# Patient Record
Sex: FEMALE | Race: WHITE | Employment: OTHER | URBAN - METROPOLITAN AREA
[De-identification: names, ages, dates, MRNs, and addresses within clinical notes are randomized per-mention and may not be internally consistent; named-entity substitution may affect disease eponyms.]

---

## 2018-12-12 PROBLEM — I48.20 CHRONIC ATRIAL FIBRILLATION (HCC): Status: ACTIVE | Noted: 2018-12-12

## 2020-09-25 PROBLEM — G47.00 INSOMNIA: Status: ACTIVE | Noted: 2020-09-25

## 2020-09-25 PROBLEM — M17.0 PRIMARY OSTEOARTHRITIS OF BOTH KNEES: Status: ACTIVE | Noted: 2020-09-25

## 2020-09-26 ENCOUNTER — HOSPITAL ENCOUNTER (OUTPATIENT)
Dept: MAMMOGRAPHY | Age: 75
Discharge: HOME | End: 2020-09-26
Attending: FAMILY MEDICINE
Payer: MEDICARE

## 2020-09-26 DIAGNOSIS — Z12.31 VISIT FOR SCREENING MAMMOGRAM: ICD-10-CM

## 2020-09-26 PROCEDURE — 77067 SCR MAMMO BI INCL CAD: CPT

## 2020-10-29 NOTE — H&P
Pre Operative History and Physical Exam    Patient ID:  Viola Guan  [id number]  69 y.o.  1945    Today: October 29, 2020       Assessment:   1. Arthritis of the right knee        Plan:    1. Proceed with scheduled Procedure(s) (LRB):  RIGHT KNEE ARTHROPLASTY TOTAL CHOICE ANES/ SPINAL (Right)            CC:  Right knee pain    HPI:   The patient has  Right knee arthritis. The patient was evaluated and examined during a consultation prior to this office visit. There have been no changes to the patient's orthopedic condition since the initial consultation. The patient has failed previous conservative treatment for this condition. The patient will present the day of surgery for Procedure(s) (LRB):  RIGHT KNEE ARTHROPLASTY TOTAL CHOICE ANES/ SPINAL (Right)      Past Medical/Surgical History:  Past Medical History:   Diagnosis Date    Asthma 11/7/2012    Atrial fibrillation (HCC)     CAD (coronary artery disease)     HLD (hyperlipidemia) 11/7/2012    HTN (hypertension) 11/7/2012    Hypercholesterolemia     Menopause 11/7/2012    Thyroid disease      Past Surgical History:   Procedure Laterality Date    HX AFIB ABLATION  06/29/2020    Dr. Jesus Manuel Kimble HX GI      rectocele    HX HYSTERECTOMY      has ovaries    HX ANY AND BSO          Allergies: Allergies   Allergen Reactions    Other Medication Other (comments)     Ants    Perfume Other (comments)     Scents make feel bad        Physical Exam:   General: NAD, Alert, Oriented, Appears their stated age     [de-identified]: NC/AT, PERRL    Skin: No rashes, lesions or wounds seen      Psych: normal affect      Heart: Regular Rate, Rhythm     Lungs: unlabored respirations, normal breath sounds     Abdomen: Soft and non-distended     Ortho:  Right knee joint line tenderness    Neuro: no focal defects, sensation is equal bilaterally     Lymph: no lymphadenopathy     Meds:   No current facility-administered medications for this encounter.       Current Outpatient Medications   Medication Sig    apixaban (ELIQUIS) 5 mg tablet Take 5 mg by mouth two (2) times a day.  levothyroxine (synthroid) 50 mcg tablet Take 1 Tab by mouth Daily (before breakfast).  zolpidem (AMBIEN) 5 mg tablet Take 1 Tab by mouth nightly as needed for Sleep. Max Daily Amount: 5 mg.  sotalol (BETAPACE) 80 mg tablet Take 160 mg by mouth two (2) times a day.  furosemide (LASIX) 40 mg tablet Take 40 mg by mouth.  potassium chloride SR (KLOR-CON 10) 10 mEq tablet Take 20 mEq by mouth daily.  amLODIPine (NORVASC) 10 mg tablet Take  by mouth daily.  atorvastatin (LIPITOR) 40 mg tablet Take  by mouth daily. Takes 20- 40mg daily    benazepril (LOTENSIN) 40 mg tablet Take 1 tablet by mouth daily. Labs:  Lab Only on 09/21/2020   Component Date Value Ref Range Status    Hemoglobin A1c 09/21/2020 5.5  4.8 - 5.6 % Final    Comment:          Prediabetes: 5.7 - 6.4           Diabetes: >6.4           Glycemic control for adults with diabetes: <7.0      Estimated average glucose 09/21/2020 111  mg/dL Final    TSH 09/21/2020 3.680  0.450 - 4.500 uIU/mL Final    Glucose 09/21/2020 104* 65 - 99 mg/dL Final    BUN 09/21/2020 8  8 - 27 mg/dL Final    Creatinine 09/21/2020 0.90  0.57 - 1.00 mg/dL Final    GFR est non-AA 09/21/2020 63  >59 mL/min/1.73 Final    GFR est AA 09/21/2020 73  >59 mL/min/1.73 Final    BUN/Creatinine ratio 09/21/2020 9* 12 - 28 Final    Sodium 09/21/2020 140  134 - 144 mmol/L Final    Potassium 09/21/2020 4.0  3.5 - 5.2 mmol/L Final    Chloride 09/21/2020 99  96 - 106 mmol/L Final    CO2 09/21/2020 25  20 - 29 mmol/L Final    Calcium 09/21/2020 9.3  8.7 - 10.3 mg/dL Final    Protein, total 09/21/2020 7.1  6.0 - 8.5 g/dL Final    Albumin 09/21/2020 4.4  3.7 - 4.7 g/dL Final    GLOBULIN, TOTAL 09/21/2020 2.7  1.5 - 4.5 g/dL Final    A-G Ratio 09/21/2020 1.6  1.2 - 2.2 Final    Bilirubin, total 09/21/2020 1.4* 0.0 - 1.2 mg/dL Final    Alk.  phosphatase 09/21/2020 79  39 - 117 IU/L Final    AST (SGOT) 09/21/2020 20  0 - 40 IU/L Final    ALT (SGPT) 09/21/2020 11  0 - 32 IU/L Final    WBC 09/21/2020 5.2  3.4 - 10.8 x10E3/uL Final    RBC 09/21/2020 4.76  3.77 - 5.28 x10E6/uL Final    HGB 09/21/2020 14.8  11.1 - 15.9 g/dL Final    HCT 09/21/2020 42.8  34.0 - 46.6 % Final    MCV 09/21/2020 90  79 - 97 fL Final    MCH 09/21/2020 31.1  26.6 - 33.0 pg Final    MCHC 09/21/2020 34.6  31.5 - 35.7 g/dL Final    RDW 09/21/2020 12.7  11.7 - 15.4 % Final    PLATELET 51/73/2178 376  150 - 450 x10E3/uL Final    NEUTROPHILS 09/21/2020 51  Not Estab. % Final    Lymphocytes 09/21/2020 33  Not Estab. % Final    MONOCYTES 09/21/2020 10  Not Estab. % Final    EOSINOPHILS 09/21/2020 4  Not Estab. % Final    BASOPHILS 09/21/2020 2  Not Estab. % Final    ABS. NEUTROPHILS 09/21/2020 2.7  1.4 - 7.0 x10E3/uL Final    Abs Lymphocytes 09/21/2020 1.7  0.7 - 3.1 x10E3/uL Final    ABS. MONOCYTES 09/21/2020 0.5  0.1 - 0.9 x10E3/uL Final    ABS. EOSINOPHILS 09/21/2020 0.2  0.0 - 0.4 x10E3/uL Final    ABS. BASOPHILS 09/21/2020 0.1  0.0 - 0.2 x10E3/uL Final    IMMATURE GRANULOCYTES 09/21/2020 0  Not Estab. % Final    ABS. IMM. GRANS. 09/21/2020 0.0  0.0 - 0.1 x10E3/uL Final    Cholesterol, total 09/21/2020 169  100 - 199 mg/dL Final    Triglyceride 09/21/2020 144  0 - 149 mg/dL Final    HDL Cholesterol 09/21/2020 44  >39 mg/dL Final    VLDL Cholesterol Herrera 09/21/2020 25  5 - 40 mg/dL Final    LDL Chol Calc (NIH) 09/21/2020 100* 0 - 99 mg/dL Final    INTERPRETATION 09/21/2020 Note   Final    Supplemental report is available.                  Patient Active Problem List   Diagnosis Code    HTN (hypertension) I10    Menopause Z78.0    HLD (hyperlipidemia) E78.5    Asthma J45.909    Acquired hypothyroidism E03.9    Chronic atrial fibrillation ([facility] [address] I48.20    Insomnia G47.00    Primary osteoarthritis of both knees M17.0         Signed By: Israel Sanders MD  October 29, 2020

## 2020-11-02 ENCOUNTER — HOSPITAL ENCOUNTER (OUTPATIENT)
Dept: SURGERY | Age: 75
Discharge: HOME | End: 2020-11-02
Payer: MEDICARE

## 2020-11-02 VITALS
HEIGHT: 62 IN | RESPIRATION RATE: 16 BRPM | HEART RATE: 69 BPM | SYSTOLIC BLOOD PRESSURE: 140 MMHG | OXYGEN SATURATION: 94 % | TEMPERATURE: 98.9 F | WEIGHT: 173 LBS | DIASTOLIC BLOOD PRESSURE: 64 MMHG | BODY MASS INDEX: 31.83 KG/M2

## 2020-11-02 LAB
ANION GAP SERPL CALC-SCNC: 6 MMOL/L (ref 7–16)
APPEARANCE UR: ABNORMAL
APTT PPP: 32.9 SEC (ref 24.1–35.1)
BACTERIA SPEC CULT: NORMAL
BACTERIA URNS QL MICRO: ABNORMAL /HPF
BASOPHILS # BLD: 0.1 K/UL (ref 0–0.2)
BASOPHILS NFR BLD: 1 % (ref 0–2)
BILIRUB UR QL: ABNORMAL
BUN SERPL-MCNC: 11 MG/DL (ref 8–23)
CALCIUM SERPL-MCNC: 8.7 MG/DL (ref 8.3–10.4)
CHLORIDE SERPL-SCNC: 105 MMOL/L (ref 98–107)
CO2 SERPL-SCNC: 30 MMOL/L (ref 21–32)
COLOR UR: ABNORMAL
CREAT SERPL-MCNC: 0.78 MG/DL (ref 0.6–1)
DIFFERENTIAL METHOD BLD: NORMAL
EOSINOPHIL # BLD: 0.2 K/UL (ref 0–0.8)
EOSINOPHIL NFR BLD: 3 % (ref 0.5–7.8)
EPI CELLS #/AREA URNS HPF: ABNORMAL /HPF
ERYTHROCYTE [DISTWIDTH] IN BLOOD BY AUTOMATED COUNT: 13.1 % (ref 11.9–14.6)
EST. AVERAGE GLUCOSE BLD GHB EST-MCNC: 114 MG/DL
GLUCOSE SERPL-MCNC: 127 MG/DL (ref 65–100)
GLUCOSE UR STRIP.AUTO-MCNC: NEGATIVE MG/DL
HBA1C MFR BLD: 5.6 % (ref 4.8–6)
HCT VFR BLD AUTO: 41.7 % (ref 35.8–46.3)
HGB BLD-MCNC: 14.3 G/DL (ref 11.7–15.4)
HGB UR QL STRIP: NEGATIVE
IMM GRANULOCYTES # BLD AUTO: 0 K/UL (ref 0–0.5)
IMM GRANULOCYTES NFR BLD AUTO: 0 % (ref 0–5)
INR PPP: 1.4
KETONES UR QL STRIP.AUTO: NEGATIVE MG/DL
LEUKOCYTE ESTERASE UR QL STRIP.AUTO: ABNORMAL
LYMPHOCYTES # BLD: 2 K/UL (ref 0.5–4.6)
LYMPHOCYTES NFR BLD: 32 % (ref 13–44)
MCH RBC QN AUTO: 31.8 PG (ref 26.1–32.9)
MCHC RBC AUTO-ENTMCNC: 34.3 G/DL (ref 31.4–35)
MCV RBC AUTO: 92.9 FL (ref 79.6–97.8)
MONOCYTES # BLD: 0.5 K/UL (ref 0.1–1.3)
MONOCYTES NFR BLD: 9 % (ref 4–12)
NEUTS SEG # BLD: 3.4 K/UL (ref 1.7–8.2)
NEUTS SEG NFR BLD: 54 % (ref 43–78)
NITRITE UR QL STRIP.AUTO: NEGATIVE
NRBC # BLD: 0 K/UL (ref 0–0.2)
PH UR STRIP: 7.5 [PH] (ref 5–9)
PLATELET # BLD AUTO: 283 K/UL (ref 150–450)
PMV BLD AUTO: 9.9 FL (ref 9.4–12.3)
POTASSIUM SERPL-SCNC: 3.4 MMOL/L (ref 3.5–5.1)
PROT UR STRIP-MCNC: NEGATIVE MG/DL
PROTHROMBIN TIME: 17.5 SEC (ref 12.5–14.7)
RBC # BLD AUTO: 4.49 M/UL (ref 4.05–5.2)
RBC #/AREA URNS HPF: ABNORMAL /HPF
SERVICE CMNT-IMP: NORMAL
SODIUM SERPL-SCNC: 141 MMOL/L (ref 136–145)
SP GR UR REFRACTOMETRY: 1.02 (ref 1–1.02)
UROBILINOGEN UR QL STRIP.AUTO: 4 EU/DL (ref 0.2–1)
WBC # BLD AUTO: 6.3 K/UL (ref 4.3–11.1)
WBC URNS QL MICRO: ABNORMAL /HPF

## 2020-11-02 PROCEDURE — 83036 HEMOGLOBIN GLYCOSYLATED A1C: CPT

## 2020-11-02 PROCEDURE — 85730 THROMBOPLASTIN TIME PARTIAL: CPT

## 2020-11-02 PROCEDURE — 85610 PROTHROMBIN TIME: CPT

## 2020-11-02 PROCEDURE — 87641 MR-STAPH DNA AMP PROBE: CPT

## 2020-11-02 PROCEDURE — 81001 URINALYSIS AUTO W/SCOPE: CPT

## 2020-11-02 PROCEDURE — 80048 BASIC METABOLIC PNL TOTAL CA: CPT

## 2020-11-02 PROCEDURE — 85025 COMPLETE CBC W/AUTO DIFF WBC: CPT

## 2020-11-02 RX ORDER — ASPIRIN 81 MG/1
81 TABLET ORAL DAILY
COMMUNITY

## 2020-11-02 NOTE — PERIOP NOTES
All labs reviewed and routed to surgeon's office. MRSA/MSSA negative.  UA shows 1+ bacteria noted and routed to surgeon's office, chart flagged for charge nurse to report abnormal UA to surgeon's office in am.

## 2020-11-02 NOTE — PERIOP NOTES
Recent Results (from the past 12 hour(s))   URINALYSIS W/ RFLX MICROSCOPIC    Collection Time: 11/02/20  1:20 PM   Result Value Ref Range    Color ANNETTA      Appearance CLOUDY      Specific gravity 1.022 1.001 - 1.023      pH (UA) 7.5 5.0 - 9.0      Protein Negative NEG mg/dL    Glucose Negative mg/dL    Ketone Negative NEG mg/dL    Bilirubin SMALL (A) NEG      Blood Negative NEG      Urobilinogen 4.0 (H) 0.2 - 1.0 EU/dL    Nitrites Negative NEG      Leukocyte Esterase SMALL (A) NEG      WBC 0-3 0 /hpf    RBC 0-3 0 /hpf    Epithelial cells 3-5 0 /hpf    Bacteria 1+ (H) 0 /hpf   MSSA/MRSA SC BY PCR, NASAL SWAB    Collection Time: 11/02/20  2:00 PM    Specimen: Nasal swab   Result Value Ref Range    Special Requests: NO SPECIAL REQUESTS      Culture result:        SA target not detected. A MRSA NEGATIVE, SA NEGATIVE test result does not preclude MRSA or SA nasal colonization. CBC WITH AUTOMATED DIFF    Collection Time: 11/02/20  2:09 PM   Result Value Ref Range    WBC 6.3 4.3 - 11.1 K/uL    RBC 4.49 4.05 - 5.2 M/uL    HGB 14.3 11.7 - 15.4 g/dL    HCT 41.7 35.8 - 46.3 %    MCV 92.9 79.6 - 97.8 FL    MCH 31.8 26.1 - 32.9 PG    MCHC 34.3 31.4 - 35.0 g/dL    RDW 13.1 11.9 - 14.6 %    PLATELET 798 029 - 873 K/uL    MPV 9.9 9.4 - 12.3 FL    ABSOLUTE NRBC 0.00 0.0 - 0.2 K/uL    DF AUTOMATED      NEUTROPHILS 54 43 - 78 %    LYMPHOCYTES 32 13 - 44 %    MONOCYTES 9 4.0 - 12.0 %    EOSINOPHILS 3 0.5 - 7.8 %    BASOPHILS 1 0.0 - 2.0 %    IMMATURE GRANULOCYTES 0 0.0 - 5.0 %    ABS. NEUTROPHILS 3.4 1.7 - 8.2 K/UL    ABS. LYMPHOCYTES 2.0 0.5 - 4.6 K/UL    ABS. MONOCYTES 0.5 0.1 - 1.3 K/UL    ABS. EOSINOPHILS 0.2 0.0 - 0.8 K/UL    ABS. BASOPHILS 0.1 0.0 - 0.2 K/UL    ABS. IMM.  GRANS. 0.0 0.0 - 0.5 K/UL   HEMOGLOBIN A1C WITH EAG    Collection Time: 11/02/20  2:09 PM   Result Value Ref Range    Hemoglobin A1c 5.6 4.8 - 6.0 %    Est. average glucose 620 mg/dL   METABOLIC PANEL, BASIC    Collection Time: 11/02/20  2:09 PM   Result Value Ref Range    Sodium 141 136 - 145 mmol/L    Potassium 3.4 (L) 3.5 - 5.1 mmol/L    Chloride 105 98 - 107 mmol/L    CO2 30 21 - 32 mmol/L    Anion gap 6 (L) 7 - 16 mmol/L    Glucose 127 (H) 65 - 100 mg/dL    BUN 11 8 - 23 MG/DL    Creatinine 0.78 0.6 - 1.0 MG/DL    GFR est AA >60 >60 ml/min/1.73m2    GFR est non-AA >60 >60 ml/min/1.73m2    Calcium 8.7 8.3 - 10.4 MG/DL   PROTHROMBIN TIME + INR    Collection Time: 11/02/20  2:09 PM   Result Value Ref Range    Prothrombin time 17.5 (H) 12.5 - 14.7 sec    INR 1.4     PTT    Collection Time: 11/02/20  2:09 PM   Result Value Ref Range    aPTT 32.9 24.1 - 35.1 SEC

## 2020-11-02 NOTE — PERIOP NOTES
Patient verified name and     Order for consent found in EHR and matches case posting; patient verified. Type 3 surgery, walk-in assessment complete. Labs per surgeon: PTT, CBC, BMP, A1c, PT/INR, MRSA/MSSA, UA, T/S dos; results pending  Labs per anesthesia protocol: no additional  EKG: EKG-10/16/2020 faxed in from [facility] and will be sent up to pre-op. Cardiac Clearance for Eliquis hold will be sent up to pre-op. Patient aware that a negative Covid swab result is required to proceed with surgery;  appointments are made by the surgeon office and test should be collected 7 days prior to surgery. The testing center is located at the . [address], [place]. Hospital approved surgical skin cleanser and instructions given per hospital policy. Patient provided with and instructed on educational handouts including Guide to Surgery, Pain Management, Hand Hygiene, Blood Transfusion Education, and [facility] Brochure. Patient answered medical/surgical history questions at their best of ability. All prior to admission medications documented in [facility]. Original medication prescription bottle were not visualized during patient appointment. Patient instructed to hold all vitamins 7 days prior to surgery and NSAIDS 5 days prior to surgery, patient verbalized understanding. Patient teach back successful and patient demonstrates knowledge of instructions.

## 2020-11-02 NOTE — PERIOP NOTES
PLEASE CONTINUE TAKING ALL PRESCRIPTION MEDICATIONS UP TO THE DAY OF SURGERY UNLESS OTHERWISE DIRECTED BELOW. DISCONTINUE all vitamins and supplements 7 days prior to surgery. DISCONTINUE Non-Steriodal Anti-Inflammatory (NSAIDS) such as Advil and Aleve 5 days prior to surgery. Home Medications to take  the day of surgery   Amlodipine (Norvasc)  Aspirin 81 mg   Levothyroxine (Synthroid)     Potassium Chloride (Klor-con)  Sotalol (Betapace)     Home Medications   to Hold     Eliquis- hold as instructed by physician. Last dose to be taken on November 4, 2020. Comments      Start Aspirin 81 mg on November 5, 6, 7, 8, 9, 10. Hibiclens shower the night before surgery and the morning of surgery. *Visitor policy of 1 visitor per patient discussed. Please do not bring home medications with you on the day of surgery unless otherwise directed by your nurse. If you are instructed to bring home medications, please give them to your nurse as they will be administered by the nursing staff. If you have any questions, please call [facility] [phone] or [facility] [phone]. A copy of this note was provided to the patient for reference.

## 2020-11-02 NOTE — PERIOP NOTES
Dr. Breanne Stack reviewed and instructed pt to start an Aspirin 81 mg after holding Eliquis for scheduled surgery.

## 2020-11-02 NOTE — PERIOP NOTES
Spoke to Dr. Cleveland Gomez Nurse; Nurse states EKG was done by Dr. Moise Laguerre at [facility] and no further EKG needed.

## 2020-11-09 ENCOUNTER — ANESTHESIA EVENT (OUTPATIENT)
Dept: SURGERY | Age: 75
DRG: 470 | End: 2020-11-09
Payer: MEDICARE

## 2020-11-10 ENCOUNTER — ANESTHESIA (OUTPATIENT)
Dept: SURGERY | Age: 75
DRG: 470 | End: 2020-11-10
Payer: MEDICARE

## 2020-11-10 ENCOUNTER — HOSPITAL ENCOUNTER (INPATIENT)
Age: 75
LOS: 1 days | Discharge: HOME HEALTH | DRG: 470 | End: 2020-11-11
Attending: ORTHOPAEDIC SURGERY | Admitting: ORTHOPAEDIC SURGERY
Payer: MEDICARE

## 2020-11-10 DIAGNOSIS — L76.82 PAIN AT SURGICAL INCISION: Primary | ICD-10-CM

## 2020-11-10 PROBLEM — M17.11 ARTHRITIS OF RIGHT KNEE: Status: ACTIVE | Noted: 2020-11-10

## 2020-11-10 LAB
ABO + RH BLD: NORMAL
APTT PPP: 27.4 SEC (ref 24.1–35.1)
BLOOD GROUP ANTIBODIES SERPL: NORMAL
HGB BLD-MCNC: 12.6 G/DL (ref 11.7–15.4)
INR PPP: 1.1
POTASSIUM BLD-SCNC: 4.4 MMOL/L (ref 3.5–5.1)
PROTHROMBIN TIME: 14.3 SEC (ref 12.5–14.7)
SPECIMEN EXP DATE BLD: NORMAL

## 2020-11-10 PROCEDURE — 76942 ECHO GUIDE FOR BIOPSY: CPT | Performed by: ORTHOPAEDIC SURGERY

## 2020-11-10 PROCEDURE — 85018 HEMOGLOBIN: CPT

## 2020-11-10 PROCEDURE — 77030040922 HC BLNKT HYPOTHRM STRY -A: Performed by: ANESTHESIOLOGY

## 2020-11-10 PROCEDURE — 85730 THROMBOPLASTIN TIME PARTIAL: CPT

## 2020-11-10 PROCEDURE — 74011250636 HC RX REV CODE- 250/636: Performed by: ORTHOPAEDIC SURGERY

## 2020-11-10 PROCEDURE — 77030020274 HC MISC IMPL ORTHOPEDIC: Performed by: ORTHOPAEDIC SURGERY

## 2020-11-10 PROCEDURE — 74011000250 HC RX REV CODE- 250: Performed by: ORTHOPAEDIC SURGERY

## 2020-11-10 PROCEDURE — 2709999900 HC NON-CHARGEABLE SUPPLY

## 2020-11-10 PROCEDURE — 77030020365 HC SOL INJ SOD CL 0.9% 50ML: Performed by: ORTHOPAEDIC SURGERY

## 2020-11-10 PROCEDURE — 77030018547 HC SUT ETHBND1 J&J -B: Performed by: ORTHOPAEDIC SURGERY

## 2020-11-10 PROCEDURE — 36415 COLL VENOUS BLD VENIPUNCTURE: CPT

## 2020-11-10 PROCEDURE — 76210000006 HC OR PH I REC 0.5 TO 1 HR: Performed by: ORTHOPAEDIC SURGERY

## 2020-11-10 PROCEDURE — 77030011208: Performed by: ORTHOPAEDIC SURGERY

## 2020-11-10 PROCEDURE — 97116 GAIT TRAINING THERAPY: CPT | Performed by: PHYSICAL THERAPIST

## 2020-11-10 PROCEDURE — 76060000035 HC ANESTHESIA 2 TO 2.5 HR: Performed by: ORTHOPAEDIC SURGERY

## 2020-11-10 PROCEDURE — 2709999900 HC NON-CHARGEABLE SUPPLY: Performed by: ORTHOPAEDIC SURGERY

## 2020-11-10 PROCEDURE — 86900 BLOOD TYPING SEROLOGIC ABO: CPT

## 2020-11-10 PROCEDURE — 77030019557 HC ELECTRD VES SEAL MEDT -F: Performed by: ORTHOPAEDIC SURGERY

## 2020-11-10 PROCEDURE — 77030013819 HC MX SYS CEM ZIMM -B: Performed by: ORTHOPAEDIC SURGERY

## 2020-11-10 PROCEDURE — 65270000029 HC RM PRIVATE

## 2020-11-10 PROCEDURE — 77030000032 HC CUF TRNQT ZIMM -B: Performed by: ORTHOPAEDIC SURGERY

## 2020-11-10 PROCEDURE — 77030007880 HC KT SPN EPDRL BBMI -B: Performed by: ANESTHESIOLOGY

## 2020-11-10 PROCEDURE — 76010010054 HC POST OP PAIN BLOCK: Performed by: ORTHOPAEDIC SURGERY

## 2020-11-10 PROCEDURE — 0SRC0J9 REPLACEMENT OF RIGHT KNEE JOINT WITH SYNTHETIC SUBSTITUTE, CEMENTED, OPEN APPROACH: ICD-10-PCS | Performed by: ORTHOPAEDIC SURGERY

## 2020-11-10 PROCEDURE — 74011250637 HC RX REV CODE- 250/637: Performed by: ORTHOPAEDIC SURGERY

## 2020-11-10 PROCEDURE — 84132 ASSAY OF SERUM POTASSIUM: CPT

## 2020-11-10 PROCEDURE — 74011250636 HC RX REV CODE- 250/636: Performed by: ANESTHESIOLOGY

## 2020-11-10 PROCEDURE — 76010000171 HC OR TIME 2 TO 2.5 HR INTENSV-TIER 1: Performed by: ORTHOPAEDIC SURGERY

## 2020-11-10 PROCEDURE — 74011000250 HC RX REV CODE- 250: Performed by: NURSE ANESTHETIST, CERTIFIED REGISTERED

## 2020-11-10 PROCEDURE — 85610 PROTHROMBIN TIME: CPT

## 2020-11-10 PROCEDURE — 77030006790 HC BLD SAW OSC ZIMM -B: Performed by: ORTHOPAEDIC SURGERY

## 2020-11-10 PROCEDURE — 74011250636 HC RX REV CODE- 250/636: Performed by: NURSE ANESTHETIST, CERTIFIED REGISTERED

## 2020-11-10 PROCEDURE — 77030002933 HC SUT MCRYL J&J -A: Performed by: ORTHOPAEDIC SURGERY

## 2020-11-10 PROCEDURE — 77030008462 HC STPLR SKN PROX J&J -A: Performed by: ORTHOPAEDIC SURGERY

## 2020-11-10 PROCEDURE — C1776 JOINT DEVICE (IMPLANTABLE): HCPCS | Performed by: ORTHOPAEDIC SURGERY

## 2020-11-10 PROCEDURE — 97161 PT EVAL LOW COMPLEX 20 MIN: CPT | Performed by: PHYSICAL THERAPIST

## 2020-11-10 PROCEDURE — 74011000250 HC RX REV CODE- 250: Performed by: ANESTHESIOLOGY

## 2020-11-10 PROCEDURE — 77030003665 HC NDL SPN BBMI -A: Performed by: ANESTHESIOLOGY

## 2020-11-10 PROCEDURE — 77030012935 HC DRSG AQUACEL BMS -B: Performed by: ORTHOPAEDIC SURGERY

## 2020-11-10 DEVICE — JOURNEY II BCS FEMORAL OXINIUM                                    RIGHT SIZE 3
Type: IMPLANTABLE DEVICE | Site: KNEE | Status: FUNCTIONAL
Brand: JOURNEY

## 2020-11-10 DEVICE — JOURNEY TIBIAL BASEPLATE NONPOROUS                                    RIGHT SIZE 3
Type: IMPLANTABLE DEVICE | Site: KNEE | Status: FUNCTIONAL
Brand: JOURNEY

## 2020-11-10 DEVICE — JOURNEY BCS PATELLA RESURFACING                                    ROUND 29 MM STANDARD
Type: IMPLANTABLE DEVICE | Site: KNEE | Status: FUNCTIONAL
Brand: JOURNEY

## 2020-11-10 DEVICE — NON RIMMED SPEED PIN 65MM STERILE: Type: IMPLANTABLE DEVICE | Site: KNEE | Status: FUNCTIONAL

## 2020-11-10 DEVICE — RIMMED SPEED PIN 45MM STERILE: Type: IMPLANTABLE DEVICE | Site: KNEE | Status: FUNCTIONAL

## 2020-11-10 DEVICE — JOURNEY II BCS CONSTRAINED                                    ARTICULAR INSERT SIZE 3-4 RIGHT 13MM
Type: IMPLANTABLE DEVICE | Site: KNEE | Status: FUNCTIONAL
Brand: JOURNEY

## 2020-11-10 RX ORDER — OXYCODONE HYDROCHLORIDE 5 MG/1
5 TABLET ORAL
Status: DISCONTINUED | OUTPATIENT
Start: 2020-11-10 | End: 2020-11-10 | Stop reason: HOSPADM

## 2020-11-10 RX ORDER — SODIUM CHLORIDE, SODIUM LACTATE, POTASSIUM CHLORIDE, CALCIUM CHLORIDE 600; 310; 30; 20 MG/100ML; MG/100ML; MG/100ML; MG/100ML
100 INJECTION, SOLUTION INTRAVENOUS CONTINUOUS
Status: DISCONTINUED | OUTPATIENT
Start: 2020-11-10 | End: 2020-11-10 | Stop reason: HOSPADM

## 2020-11-10 RX ORDER — ZOLPIDEM TARTRATE 5 MG/1
5 TABLET ORAL
Status: DISCONTINUED | OUTPATIENT
Start: 2020-11-10 | End: 2020-11-11 | Stop reason: HOSPADM

## 2020-11-10 RX ORDER — ACETAMINOPHEN 500 MG
1000 TABLET ORAL ONCE
Status: DISCONTINUED | OUTPATIENT
Start: 2020-11-10 | End: 2020-11-10 | Stop reason: HOSPADM

## 2020-11-10 RX ORDER — DEXAMETHASONE SODIUM PHOSPHATE 100 MG/10ML
10 INJECTION INTRAMUSCULAR; INTRAVENOUS ONCE
Status: DISCONTINUED | OUTPATIENT
Start: 2020-11-11 | End: 2020-11-11 | Stop reason: HOSPADM

## 2020-11-10 RX ORDER — MIDAZOLAM HYDROCHLORIDE 1 MG/ML
2 INJECTION, SOLUTION INTRAMUSCULAR; INTRAVENOUS
Status: DISCONTINUED | OUTPATIENT
Start: 2020-11-10 | End: 2020-11-10 | Stop reason: HOSPADM

## 2020-11-10 RX ORDER — AMOXICILLIN 250 MG
2 CAPSULE ORAL DAILY
Status: DISCONTINUED | OUTPATIENT
Start: 2020-11-11 | End: 2020-11-11 | Stop reason: HOSPADM

## 2020-11-10 RX ORDER — SODIUM CHLORIDE 9 MG/ML
100 INJECTION, SOLUTION INTRAVENOUS CONTINUOUS
Status: DISCONTINUED | OUTPATIENT
Start: 2020-11-10 | End: 2020-11-11 | Stop reason: HOSPADM

## 2020-11-10 RX ORDER — SODIUM CHLORIDE 0.9 % (FLUSH) 0.9 %
5-40 SYRINGE (ML) INJECTION AS NEEDED
Status: DISCONTINUED | OUTPATIENT
Start: 2020-11-10 | End: 2020-11-10 | Stop reason: HOSPADM

## 2020-11-10 RX ORDER — ONDANSETRON 2 MG/ML
INJECTION INTRAMUSCULAR; INTRAVENOUS AS NEEDED
Status: DISCONTINUED | OUTPATIENT
Start: 2020-11-10 | End: 2020-11-10 | Stop reason: HOSPADM

## 2020-11-10 RX ORDER — DEXAMETHASONE SODIUM PHOSPHATE 4 MG/ML
INJECTION, SOLUTION INTRA-ARTICULAR; INTRALESIONAL; INTRAMUSCULAR; INTRAVENOUS; SOFT TISSUE AS NEEDED
Status: DISCONTINUED | OUTPATIENT
Start: 2020-11-10 | End: 2020-11-10 | Stop reason: HOSPADM

## 2020-11-10 RX ORDER — NALOXONE HYDROCHLORIDE 0.4 MG/ML
0.2 INJECTION, SOLUTION INTRAMUSCULAR; INTRAVENOUS; SUBCUTANEOUS AS NEEDED
Status: DISCONTINUED | OUTPATIENT
Start: 2020-11-10 | End: 2020-11-10 | Stop reason: HOSPADM

## 2020-11-10 RX ORDER — OXYCODONE HYDROCHLORIDE 5 MG/1
10 TABLET ORAL
Status: DISCONTINUED | OUTPATIENT
Start: 2020-11-10 | End: 2020-11-10 | Stop reason: HOSPADM

## 2020-11-10 RX ORDER — HYDROMORPHONE HYDROCHLORIDE 1 MG/ML
1 INJECTION, SOLUTION INTRAMUSCULAR; INTRAVENOUS; SUBCUTANEOUS
Status: DISCONTINUED | OUTPATIENT
Start: 2020-11-10 | End: 2020-11-11 | Stop reason: HOSPADM

## 2020-11-10 RX ORDER — LIDOCAINE HYDROCHLORIDE 20 MG/ML
INJECTION, SOLUTION EPIDURAL; INFILTRATION; INTRACAUDAL; PERINEURAL AS NEEDED
Status: DISCONTINUED | OUTPATIENT
Start: 2020-11-10 | End: 2020-11-10 | Stop reason: HOSPADM

## 2020-11-10 RX ORDER — ACETAMINOPHEN 500 MG
1000 TABLET ORAL EVERY 6 HOURS
Status: DISCONTINUED | OUTPATIENT
Start: 2020-11-10 | End: 2020-11-11 | Stop reason: HOSPADM

## 2020-11-10 RX ORDER — HYDROMORPHONE HYDROCHLORIDE 2 MG/ML
0.5 INJECTION, SOLUTION INTRAMUSCULAR; INTRAVENOUS; SUBCUTANEOUS
Status: DISCONTINUED | OUTPATIENT
Start: 2020-11-10 | End: 2020-11-10 | Stop reason: HOSPADM

## 2020-11-10 RX ORDER — SODIUM CHLORIDE 0.9 % (FLUSH) 0.9 %
5-40 SYRINGE (ML) INJECTION EVERY 8 HOURS
Status: DISCONTINUED | OUTPATIENT
Start: 2020-11-10 | End: 2020-11-10 | Stop reason: HOSPADM

## 2020-11-10 RX ORDER — KETOROLAC TROMETHAMINE 30 MG/ML
INJECTION, SOLUTION INTRAMUSCULAR; INTRAVENOUS AS NEEDED
Status: DISCONTINUED | OUTPATIENT
Start: 2020-11-10 | End: 2020-11-10 | Stop reason: HOSPADM

## 2020-11-10 RX ORDER — CEFAZOLIN SODIUM/WATER 2 G/20 ML
2 SYRINGE (ML) INTRAVENOUS ONCE
Status: COMPLETED | OUTPATIENT
Start: 2020-11-10 | End: 2020-11-10

## 2020-11-10 RX ORDER — DIPHENHYDRAMINE HYDROCHLORIDE 50 MG/ML
12.5 INJECTION, SOLUTION INTRAMUSCULAR; INTRAVENOUS
Status: DISCONTINUED | OUTPATIENT
Start: 2020-11-10 | End: 2020-11-10 | Stop reason: HOSPADM

## 2020-11-10 RX ORDER — ROPIVACAINE HYDROCHLORIDE 2 MG/ML
INJECTION, SOLUTION EPIDURAL; INFILTRATION; PERINEURAL AS NEEDED
Status: DISCONTINUED | OUTPATIENT
Start: 2020-11-10 | End: 2020-11-10 | Stop reason: HOSPADM

## 2020-11-10 RX ORDER — ACETAMINOPHEN 650 MG/1
650 SUPPOSITORY RECTAL ONCE
Status: COMPLETED | OUTPATIENT
Start: 2020-11-10 | End: 2020-11-10

## 2020-11-10 RX ORDER — LEVOTHYROXINE SODIUM 50 UG/1
50 TABLET ORAL
Status: DISCONTINUED | OUTPATIENT
Start: 2020-11-11 | End: 2020-11-11 | Stop reason: HOSPADM

## 2020-11-10 RX ORDER — PROPOFOL 10 MG/ML
INJECTION, EMULSION INTRAVENOUS
Status: DISCONTINUED | OUTPATIENT
Start: 2020-11-10 | End: 2020-11-10 | Stop reason: HOSPADM

## 2020-11-10 RX ORDER — BUPIVACAINE HYDROCHLORIDE 7.5 MG/ML
INJECTION, SOLUTION INTRASPINAL AS NEEDED
Status: DISCONTINUED | OUTPATIENT
Start: 2020-11-10 | End: 2020-11-10 | Stop reason: HOSPADM

## 2020-11-10 RX ORDER — CELECOXIB 200 MG/1
200 CAPSULE ORAL EVERY 12 HOURS
Status: DISCONTINUED | OUTPATIENT
Start: 2020-11-10 | End: 2020-11-11 | Stop reason: HOSPADM

## 2020-11-10 RX ORDER — NALOXONE HYDROCHLORIDE 0.4 MG/ML
.2-.4 INJECTION, SOLUTION INTRAMUSCULAR; INTRAVENOUS; SUBCUTANEOUS
Status: DISCONTINUED | OUTPATIENT
Start: 2020-11-10 | End: 2020-11-11 | Stop reason: HOSPADM

## 2020-11-10 RX ORDER — ATORVASTATIN CALCIUM 40 MG/1
40 TABLET, FILM COATED ORAL
Status: DISCONTINUED | OUTPATIENT
Start: 2020-11-10 | End: 2020-11-11 | Stop reason: HOSPADM

## 2020-11-10 RX ORDER — MIDAZOLAM HYDROCHLORIDE 1 MG/ML
2 INJECTION, SOLUTION INTRAMUSCULAR; INTRAVENOUS ONCE
Status: COMPLETED | OUTPATIENT
Start: 2020-11-10 | End: 2020-11-10

## 2020-11-10 RX ORDER — EPHEDRINE SULFATE/0.9% NACL/PF 50 MG/5 ML
SYRINGE (ML) INTRAVENOUS AS NEEDED
Status: DISCONTINUED | OUTPATIENT
Start: 2020-11-10 | End: 2020-11-10 | Stop reason: HOSPADM

## 2020-11-10 RX ORDER — FLUMAZENIL 0.1 MG/ML
0.2 INJECTION INTRAVENOUS
Status: DISCONTINUED | OUTPATIENT
Start: 2020-11-10 | End: 2020-11-10 | Stop reason: HOSPADM

## 2020-11-10 RX ORDER — ACETAMINOPHEN 325 MG/1
975 TABLET ORAL ONCE
Status: COMPLETED | OUTPATIENT
Start: 2020-11-10 | End: 2020-11-10

## 2020-11-10 RX ORDER — DIPHENHYDRAMINE HCL 25 MG
25 CAPSULE ORAL
Status: DISCONTINUED | OUTPATIENT
Start: 2020-11-10 | End: 2020-11-11 | Stop reason: HOSPADM

## 2020-11-10 RX ORDER — OXYCODONE HYDROCHLORIDE 5 MG/1
5 TABLET ORAL
Status: DISCONTINUED | OUTPATIENT
Start: 2020-11-10 | End: 2020-11-11 | Stop reason: HOSPADM

## 2020-11-10 RX ORDER — POTASSIUM CHLORIDE 750 MG/1
10 TABLET, EXTENDED RELEASE ORAL DAILY
Status: DISCONTINUED | OUTPATIENT
Start: 2020-11-11 | End: 2020-11-11 | Stop reason: HOSPADM

## 2020-11-10 RX ORDER — LIDOCAINE HYDROCHLORIDE 10 MG/ML
0.1 INJECTION INFILTRATION; PERINEURAL AS NEEDED
Status: DISCONTINUED | OUTPATIENT
Start: 2020-11-10 | End: 2020-11-10 | Stop reason: HOSPADM

## 2020-11-10 RX ORDER — CEFAZOLIN SODIUM/WATER 2 G/20 ML
2 SYRINGE (ML) INTRAVENOUS EVERY 8 HOURS
Status: COMPLETED | OUTPATIENT
Start: 2020-11-10 | End: 2020-11-11

## 2020-11-10 RX ORDER — LISINOPRIL 20 MG/1
40 TABLET ORAL DAILY
Status: DISCONTINUED | OUTPATIENT
Start: 2020-11-11 | End: 2020-11-11 | Stop reason: HOSPADM

## 2020-11-10 RX ORDER — ASPIRIN 81 MG/1
81 TABLET ORAL EVERY 12 HOURS
Status: DISCONTINUED | OUTPATIENT
Start: 2020-11-10 | End: 2020-11-10

## 2020-11-10 RX ORDER — SODIUM CHLORIDE 0.9 % (FLUSH) 0.9 %
5-40 SYRINGE (ML) INJECTION EVERY 8 HOURS
Status: DISCONTINUED | OUTPATIENT
Start: 2020-11-10 | End: 2020-11-11 | Stop reason: HOSPADM

## 2020-11-10 RX ORDER — TRANEXAMIC ACID 100 MG/ML
INJECTION, SOLUTION INTRAVENOUS AS NEEDED
Status: DISCONTINUED | OUTPATIENT
Start: 2020-11-10 | End: 2020-11-10 | Stop reason: HOSPADM

## 2020-11-10 RX ORDER — OXYCODONE HYDROCHLORIDE 5 MG/1
10 TABLET ORAL
Status: DISCONTINUED | OUTPATIENT
Start: 2020-11-10 | End: 2020-11-11 | Stop reason: HOSPADM

## 2020-11-10 RX ORDER — FUROSEMIDE 40 MG/1
40 TABLET ORAL DAILY
Status: DISCONTINUED | OUTPATIENT
Start: 2020-11-11 | End: 2020-11-11 | Stop reason: HOSPADM

## 2020-11-10 RX ORDER — SODIUM CHLORIDE 0.9 % (FLUSH) 0.9 %
5-40 SYRINGE (ML) INJECTION AS NEEDED
Status: DISCONTINUED | OUTPATIENT
Start: 2020-11-10 | End: 2020-11-11 | Stop reason: HOSPADM

## 2020-11-10 RX ORDER — AMLODIPINE BESYLATE 5 MG/1
5 TABLET ORAL DAILY
Status: DISCONTINUED | OUTPATIENT
Start: 2020-11-11 | End: 2020-11-11 | Stop reason: HOSPADM

## 2020-11-10 RX ORDER — SOTALOL HYDROCHLORIDE 80 MG/1
80 TABLET ORAL 2 TIMES DAILY
Status: DISCONTINUED | OUTPATIENT
Start: 2020-11-10 | End: 2020-11-11 | Stop reason: HOSPADM

## 2020-11-10 RX ORDER — ASPIRIN 81 MG/1
81 TABLET ORAL DAILY
Status: DISCONTINUED | OUTPATIENT
Start: 2020-11-11 | End: 2020-11-11 | Stop reason: HOSPADM

## 2020-11-10 RX ORDER — FENTANYL CITRATE 50 UG/ML
100 INJECTION, SOLUTION INTRAMUSCULAR; INTRAVENOUS ONCE
Status: COMPLETED | OUTPATIENT
Start: 2020-11-10 | End: 2020-11-10

## 2020-11-10 RX ADMIN — Medication 10 MG: at 08:42

## 2020-11-10 RX ADMIN — Medication 20 ML: at 07:02

## 2020-11-10 RX ADMIN — LIDOCAINE HYDROCHLORIDE 50 MG: 20 INJECTION, SOLUTION EPIDURAL; INFILTRATION; INTRACAUDAL; PERINEURAL at 07:57

## 2020-11-10 RX ADMIN — Medication 10 MG: at 08:11

## 2020-11-10 RX ADMIN — MIDAZOLAM 2 MG: 1 INJECTION INTRAMUSCULAR; INTRAVENOUS at 06:58

## 2020-11-10 RX ADMIN — PHENYLEPHRINE HYDROCHLORIDE 120 MCG: 10 INJECTION INTRAVENOUS at 08:42

## 2020-11-10 RX ADMIN — CEFAZOLIN SODIUM 2 G: 100 INJECTION, POWDER, LYOPHILIZED, FOR SOLUTION INTRAVENOUS at 16:00

## 2020-11-10 RX ADMIN — APIXABAN 5 MG: 5 TABLET, FILM COATED ORAL at 18:42

## 2020-11-10 RX ADMIN — SODIUM CHLORIDE, SODIUM LACTATE, POTASSIUM CHLORIDE, AND CALCIUM CHLORIDE 100 ML/HR: 600; 310; 30; 20 INJECTION, SOLUTION INTRAVENOUS at 06:23

## 2020-11-10 RX ADMIN — Medication 2 G: at 08:00

## 2020-11-10 RX ADMIN — Medication 10 MG: at 09:02

## 2020-11-10 RX ADMIN — TRANEXAMIC ACID 1000 MG: 100 INJECTION, SOLUTION INTRAVENOUS at 09:25

## 2020-11-10 RX ADMIN — SODIUM CHLORIDE, SODIUM LACTATE, POTASSIUM CHLORIDE, AND CALCIUM CHLORIDE: 600; 310; 30; 20 INJECTION, SOLUTION INTRAVENOUS at 09:26

## 2020-11-10 RX ADMIN — SODIUM CHLORIDE, SODIUM LACTATE, POTASSIUM CHLORIDE, AND CALCIUM CHLORIDE: 600; 310; 30; 20 INJECTION, SOLUTION INTRAVENOUS at 07:40

## 2020-11-10 RX ADMIN — ACETAMINOPHEN 1000 MG: 500 TABLET, FILM COATED ORAL at 18:18

## 2020-11-10 RX ADMIN — SOTALOL HYDROCHLORIDE 80 MG: 80 TABLET ORAL at 21:21

## 2020-11-10 RX ADMIN — OXYCODONE 10 MG: 5 TABLET ORAL at 21:21

## 2020-11-10 RX ADMIN — PROPOFOL 160 MCG/KG/MIN: 10 INJECTION, EMULSION INTRAVENOUS at 07:57

## 2020-11-10 RX ADMIN — Medication 3 AMPULE: at 06:13

## 2020-11-10 RX ADMIN — DEXAMETHASONE SODIUM PHOSPHATE 4 MG: 4 INJECTION, SOLUTION INTRAMUSCULAR; INTRAVENOUS at 08:11

## 2020-11-10 RX ADMIN — BUPIVACAINE HYDROCHLORIDE IN DEXTROSE 1.8 ML: 7.5 INJECTION, SOLUTION SUBARACHNOID at 07:54

## 2020-11-10 RX ADMIN — TRANEXAMIC ACID 1000 MG: 100 INJECTION, SOLUTION INTRAVENOUS at 08:04

## 2020-11-10 RX ADMIN — FENTANYL CITRATE 50 MCG: 50 INJECTION, SOLUTION INTRAMUSCULAR; INTRAVENOUS at 07:04

## 2020-11-10 RX ADMIN — Medication 1 AMPULE: at 14:34

## 2020-11-10 RX ADMIN — ACETAMINOPHEN 975 MG: 325 TABLET, FILM COATED ORAL at 14:34

## 2020-11-10 RX ADMIN — ONDANSETRON 4 MG: 2 INJECTION INTRAMUSCULAR; INTRAVENOUS at 08:11

## 2020-11-10 RX ADMIN — Medication 10 MG: at 08:52

## 2020-11-10 RX ADMIN — CELECOXIB 200 MG: 200 CAPSULE ORAL at 14:34

## 2020-11-10 RX ADMIN — ATORVASTATIN CALCIUM 40 MG: 40 TABLET, FILM COATED ORAL at 21:21

## 2020-11-10 RX ADMIN — Medication 10 ML: at 14:34

## 2020-11-10 RX ADMIN — Medication 10 ML: at 21:21

## 2020-11-10 RX ADMIN — Medication 1 AMPULE: at 21:22

## 2020-11-10 RX ADMIN — SODIUM CHLORIDE 100 ML/HR: 900 INJECTION, SOLUTION INTRAVENOUS at 14:42

## 2020-11-10 NOTE — PERIOP NOTES
TRANSFER - OUT REPORT:    Verbal report given to [address] on [address] [facility]  being transferred to  for routine post - op       Report consisted of patients Situation, Background, Assessment and   Recommendations(SBAR). Information from the following report(s) SBAR, OR Summary, Procedure Summary, Intake/Output and MAR was reviewed with the receiving nurse. Lines:   Peripheral IV 11/10/20 Left Hand (Active)   Site Assessment Clean, dry, & intact 11/10/20 0955   Phlebitis Assessment 0 11/10/20 0955   Infiltration Assessment 0 11/10/20 0955   Dressing Status Clean, dry, & intact; Occlusive 11/10/20 0955   Dressing Type Transparent;Tape 11/10/20 0955   Hub Color/Line Status Green;Patent 11/10/20 0955   Alcohol Cap Used No 11/10/20 0955        Opportunity for questions and clarification was provided. Patient transported with:   O2 @ 4 liters    VTE prophylaxis orders have not been written for [address] [place] [address]. Patient and family given floor number and nurses name. Family updated re: pt status after security code verified.

## 2020-11-10 NOTE — PROGRESS NOTES
Problem: Falls - Risk of  Goal: *Absence of Falls  Description: Document Javi Faulkner Fall Risk and appropriate interventions in the flowsheet.   Outcome: Progressing Towards Goal  Note: Fall Risk Interventions:            Medication Interventions: Patient to call before getting OOB    Elimination Interventions: Call light in reach              Problem: Patient Education: Go to Patient Education Activity  Goal: Patient/Family Education  Outcome: Progressing Towards Goal     Problem: Patient Education: Go to Patient Education Activity  Goal: Patient/Family Education  Outcome: Progressing Towards Goal

## 2020-11-10 NOTE — PROGRESS NOTES
11/10/20 1222   Dual Skin Pressure Injury Assessment   Dual Skin Pressure Injury Assessment WDL   Second Care Provider (Based on [address] [place] [address]) Stephanie Palm RN   Skin Integumentary   Skin Integumentary (WDL) X    Pressure  Injury Documentation No Pressure Injury Noted-Pressure Ulcer Prevention Initiated   Skin Color Appropriate for ethnicity   Skin Condition/Temp Dry; Warm   Skin Integrity Incision (comment)  (R knee)

## 2020-11-10 NOTE — OP NOTES
[facility]  OPERATIVE REPORT    Name:  Dwight Gastelum  MR#:  [id number]  :  1945  ACCOUNT #:  [de-identified]  DATE OF SERVICE:  11/10/2020    PREOPERATIVE DIAGNOSIS:  Right knee varus osteoarthritis. POSTOPERATIVE DIAGNOSIS:  Right knee varus osteoarthritis. PROCEDURE PERFORMED:  Right total knee arthroplasty. SURGEON:  uJan Francisco Veliz MD    ASSISTANT:  Jesús Montes, fourth year medical student    ANESTHESIA:  Regional.    COMPLICATIONS:  None. SPECIMENS REMOVED:  Resected bone for disposal.    IMPLANTS:  [employer] Journey II primary size 3, Oxinium posterior stabilized femur, size 3 tibia, 13-mm constrained polyethylene insert, 29-mm patella polyethylene. ESTIMATED BLOOD LOSS:  100 mL. MEDICATIONS:  Naropin periarticular cocktail, tranexamic acid intra-articular injection, tranexamic acid IV protocol. PROCEDURE IN DETAIL:  After discussion of risks, benefits and alternatives, the patient expressed understanding and strongly desired to proceed. She was brought to the operating room where regional anesthesia was completed. The right lower extremity was prepped and draped in normal sterile fashion. Time-out was then performed. A median parapatellar approach was then performed. Severe tricompartmental osteoarthritis was demonstrated, which was most severe in the medial compartment. A knee flexion contracture and varus deformity were noted consistent with preoperative observations. Medial release was performed. Intramedullary alignment was used to perform a distal femoral resection. Extramedullary alignment was used to perform the tibial resection. Extension gap balancing was performed. The patient did appear to have a slightly large flexion gap despite the fact that resections were somewhat conservative. A flexion gap  was then used to set femoral rotation and size the distal femur. A size 3 femur was selected.   Anterior, posterior and chamfer cuts were performed. The posterior stabilized box was prepared. Patellar resection was performed. Lateral patellar facet was beveled. Trialing was performed. Excellent patellar tracking alignment was noted. There was some laxity to valgus stress. Tibial rotation was marked. Tibial bone was then prepared. Tourniquet was inflated. The cut bony surfaces were copiously irrigated and meticulously dried. Antibiotic impregnated cement was mixed on the back table and then used to coat the bone implant interface, as a size 3 tibia, size 3 Oxinium posterior stabilized femur and 29-mm patella polyethylene button were placed. A 13-mm trial polyethylene was placed. The knee was held in extension as the cement hardened with a patellar cement clamp. Extruded cement was removed from the operative field. Because of the slight opening to valgus stress, a constrained polyethylene was selected. 13-mm constrained polyethylene was then placed. It locked appropriately within the tibial delta mechanism. The knee was found to be appropriately stable with 1-2 mm of opening to varus and valgus stress. Patellar tracking was excellent. The tourniquet was deflated and meticulous hemostasis was verified. Copious irrigation was performed repeatedly including a dilute Betadine solution. Naropin periarticular cocktail and tranexamic acid intra-articular injection were performed. Tranexamic acid IV protocol was used as well. Layered wound closure was performed. The patient tolerated the procedure well with no complications. No specimen was sent to Pathology. POSTOPERATIVE PLAN:  Weight bear as tolerated. Early range of motion. P.o. analgesia. Standard postoperative antibiotic and DVT prophylaxis. Magy Ramirez MD      [id number]  D:  11/10/2020 9:52  T:  11/10/2020 11:51  JOB #:  [id number]  CC:   Mario Temple MD

## 2020-11-10 NOTE — PROGRESS NOTES
Spiritual Care visit. Initial Visit, Pre Surgery Consult. Visit and prayer before patient goes to surgery.     Visit by Margarita Panchal M.Ed., Th.B. ,Staff

## 2020-11-10 NOTE — ANESTHESIA PROCEDURE NOTES
Spinal Block    Start time: 11/10/2020 7:52 AM  End time: 11/10/2020 7:54 AM  Performed by: Svetlana Ogden MD  Authorized by: Svetlana Ogden MD     Pre-procedure:   Indications: at surgeon's request and primary anesthetic  Preanesthetic Checklist: patient identified, risks and benefits discussed, anesthesia consent, site marked, patient being monitored and timeout performed    Timeout Time: 07:51          Spinal Block:   Patient Position:  Seated  Prep Region:  Lumbar  Prep: chlorhexidine      Location:  L3-4  Technique:  Single shot        Needle:   Needle Type:  Pencan  Needle Gauge:  25 G  Attempts:  1      Events: CSF confirmed, no blood with aspiration and no paresthesia        Assessment:  Insertion:  Uncomplicated  Patient tolerance:  Patient tolerated the procedure well with no immediate complications

## 2020-11-10 NOTE — ANESTHESIA POSTPROCEDURE EVALUATION
Procedure(s):  RIGHT KNEE ARTHROPLASTY TOTAL CHOICE ANES/ SPINAL. spinal    Anesthesia Post Evaluation      Multimodal analgesia: multimodal analgesia used between 6 hours prior to anesthesia start to PACU discharge  Patient location during evaluation: PACU  Patient participation: complete - patient participated  Level of consciousness: awake and alert  Pain management: adequate  Airway patency: patent  Anesthetic complications: no  Cardiovascular status: acceptable and hemodynamically stable  Respiratory status: acceptable  Hydration status: acceptable  Post anesthesia nausea and vomiting:  none  Final Post Anesthesia Temperature Assessment:  Normothermia (36.0-37.5 degrees C)      INITIAL Post-op Vital signs:   Vitals Value Taken Time   /60 11/10/2020 10:48 AM   Temp 36.9 °C (98.5 °F) 11/10/2020 10:27 AM   Pulse 63 11/10/2020 10:52 AM   Resp 18 11/10/2020 10:27 AM   SpO2 96 % 11/10/2020 10:52 AM   Vitals shown include unvalidated device data.

## 2020-11-10 NOTE — PROGRESS NOTES
Problem: Mobility Impaired (Adult and Pediatric)  Goal: *Acute Goals and Plan of Care (Insert Text)  Description:   LTG:  (1.)Ms. Simons will move from supine to sit and sit to supine , scoot up and down, and roll side to side in bed with INDEPENDENT within 7 treatment day(s). (2.)Ms. Simons will transfer from bed to chair and chair to bed with MODIFIED INDEPENDENCE using the least restrictive device within 7 treatment day(s). (3.)Ms. Simons will ambulate with MODIFIED INDEPENDENCE for 500 feet with the least restrictive device within 7 treatment day(s). (4.)Ms. Simons will tolerate at least 15 min of dynamic standing activity to assist standing ADLs with the least restrictive device within 7 treatment days. (5.)Ms. Simons will climb at least 1 steps with MINIMAL ASSIST with the least restrictive device within 7 treatment days. ________________________________________________________________________________________________          PHYSICAL THERAPY: Initial Assessment, Daily Note, and PM 11/10/2020  INPATIENT: PT Visit Days : 1  [place] / [place] / Product Type: Medicare /       NAME/AGE/GENDER: Derl Bernheim is a 77 y.o. female   PRIMARY DIAGNOSIS: Bilateral primary osteoarthritis of knee [M17.0]  Right knee pain, unspecified chronicity [M25.561]  Arthritis of right knee [M17.11] <principal problem not specified> <principal problem not specified>  Procedure(s) (LRB):  RIGHT KNEE ARTHROPLASTY TOTAL CHOICE ANES/ SPINAL (Right)  Day of Surgery  ICD-10: Treatment Diagnosis:    · Generalized Muscle Weakness (M62.81)  · Other lack of cordination (R27.8)  · Unspecified Lack of Coordination (R27.9)   Precaution/Allergies: Other medication and Perfume      ASSESSMENT:     Ms. Carla Galdamez is a 77year old F who presents for R total knee replacement. Prior to hospital admission pt lives with her  in 1 story home with 1 step(s) to enter.  Pt endorses no falls in past 6 months. Prior to admission Ms. Simons uses a SPC for mobility. Upon entering, pt supine, agreeable to PT evaluation. she reports no pain in her knee at rest. BLE assessment indicates sensation to light touch intact, AROM lacking 8 degrees in extension and is 108 degrees of flexion of R knee, and strength WNL. Pt performed supine > sit with SBA, sitting EOB with good static sitting balance control. Sit > stand with min A using RW. Gait 3x 20 ft with RW, cues for posture and equal stance time on B LE. At end of session pt up in bedside chair with all needs within reach, alarm activated for safety, RN notified. Pt presents as functioning below her baseline, with deficits in mobility including transfers, gait, balance, and activity tolerance. Pt will benefit from skilled therapy services to address stated deficits to promote return to highest level of function, independence, and safety. Will continue to follow. This section established at most recent assessment   PROBLEM LIST (Impairments causing functional limitations):  1. Decreased Strength  2. Decreased ADL/Functional Activities  3. Decreased Transfer Abilities  4. Decreased Ambulation Ability/Technique  5. Decreased Balance  6. Increased Pain  7. Decreased Activity Tolerance  8. Decreased Flexibility/Joint Mobility   INTERVENTIONS PLANNED: (Benefits and precautions of physical therapy have been discussed with the patient.)  1. Balance Exercise  2. Bed Mobility  3. Family Education  4. Gait Training  5. Manual Therapy  6. Neuromuscular Re-education/Strengthening  7. Range of Motion (ROM)  8. Therapeutic Activites  9. Therapeutic Exercise/Strengthening  10. Transfer Training     TREATMENT PLAN: Frequency/Duration: twice daily for duration of hospital stay  Rehabilitation Potential For Stated Goals: Excellent     REHAB RECOMMENDATIONS (at time of discharge pending progress):    Placement:   It is my opinion, based on this patient's performance to date, that Ms. Kristyn may benefit from [address] after discharge due to the functional deficits listed above that are likely to improve with skilled rehabilitation because he/she has multiple medical issues that affect his/her functional mobility in the community. Equipment:    Walkers, Type: Rolling Walker   Bedside commode              HISTORY:   History of Present Injury/Illness (Reason for Referral):  See H&P  Past Medical History/Comorbidities:   Ms. Constantine Bach  has a past medical history of Asthma (11/07/2012), Atrial fibrillation ([facility] [address] CAD (coronary artery disease), HLD (hyperlipidemia) (11/07/2012), HTN (hypertension) (11/07/2012), Hypercholesterolemia, Menopause (11/7/2012), and Thyroid disease. Ms. Constantine Bach  has a past surgical history that includes hx kelly and bso; hx gi; hx afib ablation (06/29/2020); hx colonoscopy; and hx tonsillectomy. Social History/Living Environment:      Prior Level of Function/Work/Activity:  Mod I community ambulator w/ intermittent SPC use     Number of Personal Factors/Comorbidities that affect the Plan of Care: 1-2: MODERATE COMPLEXITY   EXAMINATION:   Most Recent Physical Functioning:   Gross Assessment:  AROM: Generally decreased, functional  PROM: Generally decreased, functional  Strength:  Within functional limits  Coordination: Within functional limits               Posture:     Balance:  Sitting: Intact  Standing: Impaired  Standing - Static: Good  Standing - Dynamic : Good;Fair Bed Mobility:  Supine to Sit: Stand-by assistance  Scooting: Stand-by assistance  Wheelchair Mobility:     Transfers:  Sit to Stand: Minimum assistance  Stand to Sit: Minimum assistance  Gait:  Right Side Weight Bearing: As tolerated  Base of Support: Widened  Speed/[place]: Slow;Shuffled;Pace decreased (<100 feet/min)  Step Length: Right shortened;Left shortened  Distance (ft): 60 Feet (ft)(3x20)  Assistive Device: Walker, rolling;Gait belt  Ambulation - Level of Assistance: Contact guard assistance  Interventions: Manual cues; Safety awareness training; Tactile cues; Verbal cues; Visual/Demos  Duration: 27 Minutes      Body Structures Involved:  1. Bones  2. Joints  3. Muscles Body Functions Affected:  1. Neuromusculoskeletal  2. Movement Related  3. Skin Related Activities and Participation Affected:  1. Learning and Applying Knowledge  2. General Tasks and Demands  3. Communication  4. Self Care  5. Domestic Life  6. Interpersonal Interactions and Relationships   Number of elements that affect the Plan of Care: 3: MODERATE COMPLEXITY   CLINICAL PRESENTATION:   Presentation: Stable and uncomplicated: LOW COMPLEXITY   CLINICAL DECISION MAKIN[address] [place] [address] AM-PAC 6 Clicks   Basic Mobility Inpatient Short Form  How much difficulty does the patient currently have. .. Unable A Lot A Little None   1. Turning over in bed (including adjusting bedclothes, sheets and blankets)? [] 1   [] 2   [] 3   [x] 4   2. Sitting down on and standing up from a chair with arms ( e.g., wheelchair, bedside commode, etc.)   [] 1   [] 2   [x] 3   [] 4   3. Moving from lying on back to sitting on the side of the bed? [] 1   [] 2   [] 3   [x] 4   How much help from another person does the patient currently need. .. Total A Lot A Little None   4. Moving to and from a bed to a chair (including a wheelchair)? [] 1   [] 2   [x] 3   [] 4   5. Need to walk in hospital room? [] 1   [] 2   [x] 3   [] 4   6. Climbing 3-5 steps with a railing? [] 1   [] 2   [x] 3   [] 4   © , Trustees of [address] [place], [place] [id number], under license to [employer]. All rights reserved      Score:  Initial: 20 Most Recent: X (Date: -- )    Interpretation of Tool:  Represents activities that are increasingly more difficult (i.e. Bed mobility, Transfers, Gait). Medical Necessity:     · Skilled intervention continues to be required due to decreased functional mobility.   Reason for Services/Other Comments:  ·    Use of outcome tool(s) and clinical judgement create a POC that gives a: Clear prediction of patient's progress: LOW COMPLEXITY            TREATMENT:   (In addition to Assessment/Re-Assessment sessions the following treatments were rendered)   Pre-treatment Symptoms/Complaints: \"Nice to meet you\"  Pain: Initial:     0 Post Session:  0     Gait Training (27 Minutes):  Gait training to improve and/or restore physical functioning as related to mobility, strength, balance and coordination. Ambulated 60 Feet (ft)(3x20) with Contact guard assistance using a Walker, rolling;Gait belt and minimal Manual cues; Safety awareness training; Tactile cues; Verbal cues; Visual/Demos related to their stance phase, ankle position and motion and hip position and motion to promote proper body alignment, promote proper body posture, promote proper body mechanics and promote proper body breathing techniques. Instruction in performance of walker use to correct stance phase and stride length. Braces/Orthotics/Lines/Etc:   · IV  · O2 Device: Nasal cannula  Treatment/Session Assessment:    · Response to Treatment:  Mild-moderate fatigue  · Interdisciplinary Collaboration:   o Physical Therapist  o Registered Nurse  · After treatment position/precautions:   o Up in chair  o Bed/Chair-wheels locked  o Bed in low position  o Call light within reach  o RN notified  o Family at bedside   · Compliance with Program/Exercises: Will assess as treatment progresses  · Recommendations/Intent for next treatment session: \"Next visit will focus on advancements to more challenging activities\".   Total Treatment Duration:  PT Patient Time In/Time Out  Time In: [facility]  Time Out: [address]

## 2020-11-10 NOTE — ANESTHESIA PREPROCEDURE EVALUATION
Relevant Problems   No relevant active problems       Anesthetic History   No history of anesthetic complications            Review of Systems / Medical History  Patient summary reviewed and pertinent labs reviewed    Pulmonary            Asthma : well controlled       Neuro/Psych   Within defined limits           Cardiovascular    Hypertension        Dysrhythmias (s/p ablation - on Eliquis - last dose ) : atrial fibrillation  CAD (moderate, non obstructive per cath '14)    Exercise tolerance: >4 METS  Comments: '14 cath showed EF 40%?  Patient denies   GI/Hepatic/Renal                Endo/Other      Hypothyroidism: well controlled  Arthritis     Other Findings            Physical Exam    Airway  Mallampati: III  TM Distance: 4 - 6 cm  Neck ROM: normal range of motion   Mouth opening: Diminished (comment)     Cardiovascular    Rhythm: regular  Rate: normal         Dental    Dentition: Upper partial plate and Poor dentition     Pulmonary  Breath sounds clear to auscultation               Abdominal         Other Findings            Anesthetic Plan    ASA: 3  Anesthesia type: spinal      Post-op pain plan if not by surgeon: peripheral nerve block single    Induction: Intravenous  Anesthetic plan and risks discussed with: Patient and Spouse

## 2020-11-10 NOTE — ANESTHESIA PROCEDURE NOTES
Peripheral Block    Start time: 11/10/2020 6:59 AM  End time: 11/10/2020 7:02 AM  Performed by: Owen Up MD  Authorized by: Owen Up MD       Pre-procedure: Indications: at surgeon's request and post-op pain management    Preanesthetic Checklist: patient identified, risks and benefits discussed, site marked, timeout performed, anesthesia consent given and patient being monitored    Timeout Time: 06:58          Block Type:   Block Type:   Adductor canal  Laterality:  Right  Monitoring:  Continuous pulse ox, frequent vital sign checks, heart rate, responsive to questions and oxygen  Injection Technique:  Single shot  Procedures: ultrasound guided    Prep: chlorhexidine    Location:  Mid thigh  Needle Type:  Stimuplex  Needle Gauge:  20 G  Needle Localization:  Anatomical landmarks, infiltration and ultrasound guidance    Assessment:  Number of attempts:  1  Injection Assessment:  Incremental injection every 5 mL, negative aspiration for CSF, local visualized surrounding nerve on ultrasound, negative aspiration for blood, no intravascular symptoms, no paresthesia and ultrasound image on chart  Patient tolerance:  Patient tolerated the procedure well with no immediate complications

## 2020-11-11 ENCOUNTER — HOME HEALTH ADMISSION (OUTPATIENT)
Dept: HOME HEALTH SERVICES | Facility: HOME HEALTH | Age: 75
End: 2020-11-11
Payer: MEDICARE

## 2020-11-11 VITALS
WEIGHT: 171.2 LBS | SYSTOLIC BLOOD PRESSURE: 147 MMHG | RESPIRATION RATE: 18 BRPM | HEIGHT: 62 IN | DIASTOLIC BLOOD PRESSURE: 53 MMHG | HEART RATE: 64 BPM | TEMPERATURE: 97.6 F | BODY MASS INDEX: 31.5 KG/M2 | OXYGEN SATURATION: 96 %

## 2020-11-11 LAB — HGB BLD-MCNC: 11.7 G/DL (ref 11.7–15.4)

## 2020-11-11 PROCEDURE — 97165 OT EVAL LOW COMPLEX 30 MIN: CPT

## 2020-11-11 PROCEDURE — 36415 COLL VENOUS BLD VENIPUNCTURE: CPT

## 2020-11-11 PROCEDURE — 97110 THERAPEUTIC EXERCISES: CPT

## 2020-11-11 PROCEDURE — 77030027138 HC INCENT SPIROMETER -A

## 2020-11-11 PROCEDURE — 97116 GAIT TRAINING THERAPY: CPT

## 2020-11-11 PROCEDURE — 74011000250 HC RX REV CODE- 250: Performed by: ORTHOPAEDIC SURGERY

## 2020-11-11 PROCEDURE — 74011250636 HC RX REV CODE- 250/636: Performed by: ORTHOPAEDIC SURGERY

## 2020-11-11 PROCEDURE — 85018 HEMOGLOBIN: CPT

## 2020-11-11 PROCEDURE — 97535 SELF CARE MNGMENT TRAINING: CPT

## 2020-11-11 PROCEDURE — 74011250637 HC RX REV CODE- 250/637: Performed by: ORTHOPAEDIC SURGERY

## 2020-11-11 RX ORDER — OXYCODONE HYDROCHLORIDE 5 MG/1
5 TABLET ORAL
Qty: 40 TAB | Refills: 0 | Status: SHIPPED | OUTPATIENT
Start: 2020-11-11 | End: 2020-11-18

## 2020-11-11 RX ADMIN — AMLODIPINE BESYLATE 5 MG: 5 TABLET ORAL at 08:37

## 2020-11-11 RX ADMIN — CELECOXIB 200 MG: 200 CAPSULE ORAL at 08:37

## 2020-11-11 RX ADMIN — CEFAZOLIN SODIUM 2 G: 100 INJECTION, POWDER, LYOPHILIZED, FOR SOLUTION INTRAVENOUS at 00:10

## 2020-11-11 RX ADMIN — FUROSEMIDE 40 MG: 40 TABLET ORAL at 08:37

## 2020-11-11 RX ADMIN — Medication 1 AMPULE: at 08:37

## 2020-11-11 RX ADMIN — ACETAMINOPHEN 1000 MG: 500 TABLET, FILM COATED ORAL at 05:36

## 2020-11-11 RX ADMIN — POTASSIUM CHLORIDE 10 MEQ: 750 TABLET, EXTENDED RELEASE ORAL at 08:37

## 2020-11-11 RX ADMIN — APIXABAN 5 MG: 5 TABLET, FILM COATED ORAL at 08:37

## 2020-11-11 RX ADMIN — Medication 10 ML: at 05:37

## 2020-11-11 RX ADMIN — LEVOTHYROXINE SODIUM 50 MCG: 0.05 TABLET ORAL at 05:36

## 2020-11-11 RX ADMIN — LISINOPRIL 40 MG: 20 TABLET ORAL at 08:37

## 2020-11-11 RX ADMIN — SOTALOL HYDROCHLORIDE 80 MG: 80 TABLET ORAL at 08:37

## 2020-11-11 RX ADMIN — ASPIRIN 81 MG: 81 TABLET ORAL at 08:37

## 2020-11-11 RX ADMIN — SENNOSIDES AND DOCUSATE SODIUM 2 TABLET: 8.6; 5 TABLET ORAL at 08:37

## 2020-11-11 NOTE — PROGRESS NOTES
Pt is a 76 yo female admitted for a right TKA due to osteoarthritis. SW consult received to arrange [facility] services. SW met with pt to discuss dc needs. Per hospital protocol, CM wore appropriate PPE and observed social distancing. No direct physical contact. Demographics, insurance and PCP confirmed. Pt agreeable with HH services and expressed no preference of agency. Referral for PT/OT services submitted to [facility]. Pt also in need of a rolling walker and BSC. Both items supplied by [facility] and delivered to pt's room prior to dc. Pt signed the consent which was faxed to their office. No other dc needs or concerns identified or reported. SW remains available to assist as needed. Care Management Interventions  PCP Verified by CM: Yes  Last Visit to PCP: 09/25/20  Mode of Transport at Discharge:  Other (see comment)(spouse)  Transition of Care Consult (CM Consult): [address]: Yes  Discharge Durable Medical Equipment: Yes(fixed wheel RW and 3in1 BSC from [facility])  Physical Therapy Consult: Yes  Occupational Therapy Consult: Yes  Speech Therapy Consult: No  Current Support Network: Lives with Spouse, Own Home  Confirm Follow Up Transport: Family  The Plan for Transition of Care is Related to the Following Treatment Goals : Home health therapy services to improve pts strength, ambulation and functional abilities s/p right TKA  The Patient and/or Patient Representative was Provided with a Choice of Provider and Agrees with the Discharge Plan?: Yes  Freedom of Choice List was Provided with Basic Dialogue that Supports the Patient's Individualized Plan of Care/Goals, Treatment Preferences and Shares the Quality Data Associated with the Providers?: Yes  Discharge Location  Discharge Placement: Home with home health([place])

## 2020-11-11 NOTE — DISCHARGE INSTRUCTIONS
DO NOT remove the dressing on your knee until [address] visits    Ice until swelling subsides      MAY shower . NO TUB BATHING    ACTIVITY Ambulate with walker                    Weight bearing as tolerated    DO NOT  place pillows under the knee      NO driving until cleared by Dr. Jesenia Avendano if ([phone]):   Fever >100.5             Incision becomes red,  swollen or opens up                     Incision has yellow, thick drainage or an odor                    Pain is not managed with prescribed medications                    Excessive nausea and/or vomiting    Report to Dr. Adal Avendano any  redness ,swelling or pain  in your calf, back of knee, thigh or groin    Go to emergency room for sudden chest pain and difficulty breathing        Avoid having pets sleep in bed with you until incision is completely healed

## 2020-11-11 NOTE — PROGRESS NOTES
Problem: Self Care Deficits Care Plan (Adult)  Goal: *Acute Goals and Plan of Care (Insert Text)  Description: 1. Patient will complete lower body bathing and dressing with setup and adaptive equipment as needed. 2. Patient will complete functional transfers with supervision and adaptive equipment as needed. 3. Patient will complete toileting with supervision. 4. Patient will tolerate at least 20 minutes of BUE therapeutic exercises to strengthen BUE to aid in functional transfers. 5. Patient will complete functional mobility of household distances with supervision and adaptive equipment as needed. Timeframe: 7 visits     Outcome: Progressing Towards Goal     OCCUPATIONAL THERAPY: Initial Assessment and Daily Note 11/11/2020  INPATIENT: OT Visit Days: 1  [place] / Product Type: Medicare /      NAME/AGE/GENDER: Antonio Boyd is a 77 y.o. female   PRIMARY DIAGNOSIS:  Bilateral primary osteoarthritis of knee [M17.0]  Right knee pain, unspecified chronicity [M25.561]  Arthritis of right knee [M17.11] <principal problem not specified> <principal problem not specified>  Procedure(s) (LRB):  RIGHT KNEE ARTHROPLASTY TOTAL CHOICE ANES/ SPINAL (Right)  1 Day Post-Op  ICD-10: Treatment Diagnosis:    Pain in Right Knee (M25.561)  Stiffness of Right Knee, Not elsewhere classified (M25.661)   Precautions/Allergies:    WBAT RLE    Other medication and Perfume      ASSESSMENT:     Ms. Yisel Hinojosa is a 77year old female who is now s/p above procedure and WBAT in RLE. At baseline she lives with spouse and is typically independent with all ADLs, IADLs. She has been using a cane as needed. Patient seated in chair upon arrival, agreeable to OT eval. Alert and oriented. Reports pain 1/10 at rest. BUE assessment reveals ROM, strength WFL. Balance intact in sitting, impaired in standing (good, fair).  Patient is currently functioning below her baseline and would benefit from continued occupational therapy to increase independence and safety. Will follow. 11/11/2020 Treatment: Patient participated in ADL activity: apply deodorant in standing with CGA, donning bra and shirt with CGA in standing, doing [facility] gown with minimal assistance. Patient required moderate assistance for lower body dressing (max A for socks, min A for pants). Educated patient on compensatory techniques for LB dressing and functional transfers. Patient then practiced functional mobility in [place] with SBA and RW. Excellent effort! This section established at most recent assessment   PROBLEM LIST (Impairments causing functional limitations):  Decreased ADL/Functional Activities  Decreased Transfer Abilities  Decreased Ambulation Ability/Technique  Decreased Balance  Increased Pain  Decreased Activity Tolerance  Decreased Flexibility/Joint Mobility  Decreased Knowledge of Precautions   INTERVENTIONS PLANNED: (Benefits and precautions of occupational therapy have been discussed with the patient.)  Activities of daily living training  Adaptive equipment training  Therapeutic activity  Therapeutic exercise     TREATMENT PLAN: Frequency/Duration: Follow patient 4x/ week to address above goals. Rehabilitation Potential For Stated Goals: Good     REHAB RECOMMENDATIONS (at time of discharge pending progress):    Placement: It is my opinion, based on this patient's performance to date, that Ms. Simons may benefit from [address] [facility] after discharge due to the functional deficits listed above that are likely to improve with skilled rehabilitation.    Equipment:   RW & BSC--> already delivered to patient room               OCCUPATIONAL PROFILE AND HISTORY:   History of Present Injury/Illness (Reason for Referral):  R TKA   Past Medical History/Comorbidities:   Ms. Nitesh Ty  has a past medical history of Asthma (11/07/2012), Atrial fibrillation ([facility] [address] CAD (coronary artery disease), HLD (hyperlipidemia) (11/07/2012), HTN (hypertension) (11/07/2012), Hypercholesterolemia, Menopause (11/7/2012), and Thyroid disease. Ms. Suman Adler  has a past surgical history that includes hx kelly and bso; hx gi; hx afib ablation (06/29/2020); hx colonoscopy; and hx tonsillectomy. Social History/Living Environment:   Home Environment: Private residence  Current DME Used/Available at Home: Cane, straight, Commode, bedside, Walker, rolling  Prior Level of Function/Work/Activity:  At baseline she lives with spouse and is typically independent with all ADLs, IADLs. She has been using a cane as needed. Number of Personal Factors/Comorbidities that affect the Plan of Care: Brief history (0):  LOW COMPLEXITY   ASSESSMENT OF OCCUPATIONAL PERFORMANCE[de-identified]   Activities of Daily Living:   Basic ADLs (From Assessment) Complex ADLs (From Assessment)   Feeding: Setup  Oral Facial Hygiene/Grooming: Setup  Bathing: Moderate assistance  Upper Body Dressing: Setup  Lower Body Dressing: Maximum assistance  Toileting: Minimum assistance Instrumental ADL  Meal Preparation: Maximum assistance  Homemaking: Maximum assistance  Medication Management: Independent  Financial Management: Independent   Grooming/Bathing/Dressing Activities of Daily Living   Grooming  Grooming Assistance: Contact guard assistance  Position Performed: Standing  Applying Makeup: Contact guard assistance(deoderant) Cognitive Retraining  Safety/Judgement: Awareness of environment; Fall prevention               Upper Body Dressing Assistance  Dressing Assistance: Minimum assistance  Bra: Stand-by assistance  Hospital Gown: Minimum  assistance  Pullover Shirt: Stand-by assistance     Lower Body Dressing Assistance  Pants With Elastic Waist: Contact guard assistance  Socks: Maximum assistance  Slip on Shoes with Back: Minimum assistance  Position Performed: Seated in chair; Other (comment)(stsanding ) Bed/Mat Mobility  Sit to Stand: Contact guard assistance  Stand to Sit: Contact guard assistance     Most Recent Physical Functioning:   Gross Assessment:  AROM: Within functional limits  Strength: Within functional limits               Posture:     Balance:  Sitting: Intact  Standing: Impaired  Standing - Static: Good  Standing - Dynamic : Fair Bed Mobility:     Wheelchair Mobility:     Transfers:  Sit to Stand: Contact guard assistance  Stand to Sit: Contact guard assistance            Patient Vitals for the past 6 hrs:   BP BP Patient Position SpO2 Pulse   20 0731 (!) 147/53 At rest 95 % 64   20 0850 -- -- 96 % --       Mental Status  Neurologic State: Alert  Orientation Level: Oriented X4  Cognition: Follows commands  Perception: Appears intact  Perseveration: No perseveration noted  Safety/Judgement: Awareness of environment, Fall prevention                          Physical Skills Involved:  Range of Motion  Balance  Activity Tolerance  Pain (acute) Cognitive Skills Affected (resulting in the inability to perform in a timely and safe manner):  NONE  Psychosocial Skills Affected:  Habits/Routines  Environmental Adaptation   Number of elements that affect the Plan of Care: 5+:  HIGH COMPLEXITY   CLINICAL DECISION MAKIN[address] [place] [id number] AM-PAC 6 Clicks   Daily Activity Inpatient Short Form  How much help from another person does the patient currently need. .. Total A Lot A Little None   1. Putting on and taking off regular lower body clothing? [] 1   [x] 2   [] 3   [] 4   2. Bathing (including washing, rinsing, drying)? [] 1   [x] 2   [] 3   [] 4   3. Toileting, which includes using toilet, bedpan or urinal?   [] 1   [x] 2   [] 3   [] 4   4. Putting on and taking off regular upper body clothing? [] 1   [] 2   [x] 3   [] 4   5. Taking care of personal grooming such as brushing teeth? [] 1   [] 2   [x] 3   [] 4   6. Eating meals? [] 1   [] 2   [x] 3   [] 4   © , Trustees of [address] [place] [address] [address] [address], under license to [employer].  All rights reserved      Score: Initial: 15 Most Recent: X (Date: -- )    Interpretation of Tool:  Represents activities that are increasingly more difficult (i.e. Bed mobility, Transfers, Gait). Medical Necessity:     Patient demonstrates   good   rehab potential due to higher previous functional level. Reason for Services/Other Comments:  Patient   continues to require present interventions due to patient's inability to care for self at baseline level of function. .   Use of outcome tool(s) and clinical judgement create a POC that gives a: MODERATE COMPLEXITY         TREATMENT:   (In addition to Assessment/Re-Assessment sessions the following treatments were rendered)     Pre-treatment Symptoms/Complaints:    Pain: Initial:   Pain Intensity 1: 1  Post Session:  none      Self Care: (10 minutes):  Patient participated in ADL activity: apply deodorant in standing with CGA, donning bra and shirt with CGA in standing, [facility] gown with minimal assistance. Patient required moderate assistance for lower body dressing (max A for socks, min A for pants). Educated patient on compensatory techniques for LB dressing and functional transfers. Patient then practiced functional mobility in hallway with SBA and RW. Excellent effort! Braces/Orthotics/Lines/Etc:   RA  Treatment/Session Assessment:    Response to Treatment:  tolerated well   Interdisciplinary Collaboration:   Occupational Therapist  Registered Nurse  After treatment position/precautions:   Up in chair  Bed/Chair-wheels locked  Bed in low position  Call light within reach  RN notified   Compliance with Program/Exercises: Compliant all of the time. Recommendations/Intent for next treatment session: \"Next visit will focus on advancements to more challenging activities and reduction in assistance provided\".   Total Treatment Duration:  OT Patient Time In/Time Out  Time In: 0954  Time Out: 1411 [address] [place]/L

## 2020-11-11 NOTE — PROGRESS NOTES
Orthopedic Progress Note    2020  Admit Date: 11/10/2020  Admit Diagnosis: Bilateral primary osteoarthritis of knee [M17.0]  Right knee pain, unspecified chronicity [M25.561]  Arthritis of right knee [M17.11]    Post Op day: 1 Day Post-Op    Subjective:     Abdulaziz Simons     Appears to be doing okay. Decreased O2 sat last night.  95% now       Objective:     Vital Signs:    Temp (24hrs), Av.8 °F (36.6 °C), Min:97.3 °F (36.3 °C), Max:98.5 °F (36.9 °C)      LAB:    [unfilled]  Lab Results   Component Value Date/Time    INR 1.1 11/10/2020 06:56 AM    INR 1.4 2020 02:09 PM     Lab Results   Component Value Date/Time    HGB 11.7 2020 04:28 AM    HGB 12.6 11/10/2020 07:58 PM       Physical Exam:    No apparent distress   No neurovascular issues reported  No gross problems noted   No dyspnea  Dressing dry, calf soft    Plan:     Continue PT/OT rehab as indicated    Nursing and SS for disposition plans  DC today if O2 stable         Signed By: Israel Sanders MD

## 2020-11-11 NOTE — PROGRESS NOTES
Problem: Falls - Risk of  Goal: *Absence of Falls  Description: Document Rosa Kendra Fall Risk and appropriate interventions in the flowsheet.   Outcome: Resolved/Met  Note: Fall Risk Interventions:  Mobility Interventions: Bed/chair exit alarm, OT consult for ADLs, Patient to call before getting OOB, PT Consult for mobility concerns         Medication Interventions: Bed/chair exit alarm, Patient to call before getting OOB, Teach patient to arise slowly    Elimination Interventions: Bed/chair exit alarm, Patient to call for help with toileting needs, Toileting schedule/hourly rounds              Problem: Patient Education: Go to Patient Education Activity  Goal: Patient/Family Education  Outcome: Resolved/Met     Problem: Patient Education: Go to Patient Education Activity  Goal: Patient/Family Education  Outcome: Resolved/Met     Problem: Patient Education: Go to Patient Education Activity  Goal: Patient/Family Education  Outcome: Resolved/Met

## 2020-11-11 NOTE — PROGRESS NOTES
Problem: Mobility Impaired (Adult and Pediatric)  Goal: *Acute Goals and Plan of Care (Insert Text)  Description:   LTG:  (1.)Ms. Simons will move from supine to sit and sit to supine , scoot up and down, and roll side to side in bed with INDEPENDENT within 7 treatment day(s). (2.)Ms. Simons will transfer from bed to chair and chair to bed with MODIFIED INDEPENDENCE using the least restrictive device within 7 treatment day(s). (3.)Ms. Simons will ambulate with MODIFIED INDEPENDENCE for 500 feet with the least restrictive device within 7 treatment day(s). (4.)Ms. Simons will tolerate at least 15 min of dynamic standing activity to assist standing ADLs with the least restrictive device within 7 treatment days. (5.)Ms. Simons will climb at least 1 steps with MINIMAL ASSIST with the least restrictive device within 7 treatment days. ________________________________________________________________________________________________          PHYSICAL THERAPY: Daily Note and AM 11/11/2020  INPATIENT: PT Visit Days : 2  [place] / Product Type: Medicare /       NAME/AGE/GENDER: Malia Casas is a 77 y.o. female   PRIMARY DIAGNOSIS: Bilateral primary osteoarthritis of knee [M17.0]  Right knee pain, unspecified chronicity [M25.561]  Arthritis of right knee [M17.11] <principal problem not specified> <principal problem not specified>  Procedure(s) (LRB):  RIGHT KNEE ARTHROPLASTY TOTAL CHOICE ANES/ SPINAL (Right)  1 Day Post-Op  ICD-10: Treatment Diagnosis:    · Generalized Muscle Weakness (M62.81)  · Other lack of cordination (R27.8)  · Unspecified Lack of Coordination (R27.9)   Precaution/Allergies: Other medication and Perfume      ASSESSMENT:     Ms. Shania Copeland is a 77year old F who presents for R total knee replacement. Prior to hospital admission pt lives with her  in 1 story home with 1 step(s) to enter. Pt endorses no falls in past 6 months.  Prior to admission Ms. Simons uses a SPC for mobility. The patient is supine and agreeable to treatment upon contact. She required decreased assistance with transfers and bed mobility SBA. O2 checked throughout on RA, maintained above 92%. She increased ambulation distance to 100' w/ RW and supervision, minimal cues for walker placement and posture. Overall the patient is making good progress toward therapy goals as indicated by increased gait distances. Will continue skilled PT treatment as patient is still below functional baseline. This section established at most recent assessment   PROBLEM LIST (Impairments causing functional limitations):  1. Decreased Strength  2. Decreased ADL/Functional Activities  3. Decreased Transfer Abilities  4. Decreased Ambulation Ability/Technique  5. Decreased Balance  6. Increased Pain  7. Decreased Activity Tolerance  8. Decreased Flexibility/Joint Mobility   INTERVENTIONS PLANNED: (Benefits and precautions of physical therapy have been discussed with the patient.)  1. Balance Exercise  2. Bed Mobility  3. Family Education  4. Gait Training  5. Manual Therapy  6. Neuromuscular Re-education/Strengthening  7. Range of Motion (ROM)  8. Therapeutic Activites  9. Therapeutic Exercise/Strengthening  10. Transfer Training     TREATMENT PLAN: Frequency/Duration: twice daily for duration of hospital stay  Rehabilitation Potential For Stated Goals: Excellent     REHAB RECOMMENDATIONS (at time of discharge pending progress):    Placement: It is my opinion, based on this patient's performance to date, that Ms. Simons may benefit from [address] after discharge due to the functional deficits listed above that are likely to improve with skilled rehabilitation because he/she has multiple medical issues that affect his/her functional mobility in the community.   Equipment:    Walkers, Type: Rolling Walker   Bedside commode              HISTORY:   History of Present Injury/Illness (Reason for Referral):  See H&P  Past Medical History/Comorbidities:   Ms. Jessica Sanchez  has a past medical history of Asthma (11/07/2012), Atrial fibrillation ([address] CAD (coronary artery disease), HLD (hyperlipidemia) (11/07/2012), HTN (hypertension) (11/07/2012), Hypercholesterolemia, Menopause (11/7/2012), and Thyroid disease. Ms. Jessica Sanchez  has a past surgical history that includes hx kelly and bso; hx gi; hx afib ablation (06/29/2020); hx colonoscopy; and hx tonsillectomy. Social History/Living Environment:   Home Environment: Private residence  Current DME Used/Available at Home: Libia Distad, straight, Commode, bedside, Walker, rolling  Prior Level of Function/Work/Activity:  Mod I community ambulator w/ intermittent SPC use     Number of Personal Factors/Comorbidities that affect the Plan of Care: 1-2: MODERATE COMPLEXITY   EXAMINATION:   Most Recent Physical Functioning:   Gross Assessment:                  Posture:     Balance:  Sitting: Intact  Standing: Impaired  Standing - Static: Good  Standing - Dynamic : Good;Constant support Bed Mobility:  Supine to Sit: Supervision  Scooting: Supervision  Wheelchair Mobility:     Transfers:  Sit to Stand: Contact guard assistance;Stand-by assistance  Stand to Sit: Stand-by assistance  Gait:     Base of Support: Widened  Speed/[place]: Slow;Shuffled  Distance (ft): 100 Feet (ft)  Assistive Device: Walker, rolling  Ambulation - Level of Assistance: Stand-by assistance;Supervision  Interventions: Verbal cues; Safety awareness training      Body Structures Involved:  1. Bones  2. Joints  3. Muscles Body Functions Affected:  1. Neuromusculoskeletal  2. Movement Related  3. Skin Related Activities and Participation Affected:  1. Learning and Applying Knowledge  2. General Tasks and Demands  3. Communication  4. Self Care  5. Domestic Life  6.  Interpersonal Interactions and Relationships   Number of elements that affect the Plan of Care: 3: MODERATE COMPLEXITY   CLINICAL PRESENTATION: Presentation: Stable and uncomplicated: LOW COMPLEXITY   CLINICAL DECISION MAKIN[address] [place] [address] AM-PAC 6 Clicks   Basic Mobility Inpatient Short Form  How much difficulty does the patient currently have. .. Unable A Lot A Little None   1. Turning over in bed (including adjusting bedclothes, sheets and blankets)? [] 1   [] 2   [] 3   [x] 4   2. Sitting down on and standing up from a chair with arms ( e.g., wheelchair, bedside commode, etc.)   [] 1   [] 2   [x] 3   [] 4   3. Moving from lying on back to sitting on the side of the bed? [] 1   [] 2   [] 3   [x] 4   How much help from another person does the patient currently need. .. Total A Lot A Little None   4. Moving to and from a bed to a chair (including a wheelchair)? [] 1   [] 2   [x] 3   [] 4   5. Need to walk in hospital room? [] 1   [] 2   [x] 3   [] 4   6. Climbing 3-5 steps with a railing? [] 1   [] 2   [x] 3   [] 4   © , Trustees of [address] [place] [address], under license to [employer]. All rights reserved      Score:  Initial: 20 Most Recent: X (Date: -- )    Interpretation of Tool:  Represents activities that are increasingly more difficult (i.e. Bed mobility, Transfers, Gait). Medical Necessity:     · Skilled intervention continues to be required due to decreased functional mobility. Reason for Services/Other Comments:  ·    Use of outcome tool(s) and clinical judgement create a POC that gives a: Clear prediction of patient's progress: LOW COMPLEXITY            TREATMENT:   (In addition to Assessment/Re-Assessment sessions the following treatments were rendered)   Pre-treatment Symptoms/Complaints: \"Nice to meet you\"  Pain: Initial:   Pain Intensity 1: 0 0 Post Session:  0         Gait Training (  15 min):  Gait training to improve and/or restore physical functioning as related to mobility, strength and balance.   Ambulated 100 Feet (ft) with Stand-by assistance;Supervision using a Walker, rolling and minimal Verbal cues;Safety awareness training related to their stance phase, knee position and motion and walker management to promote proper body alignment, promote proper body posture and promote proper body mechanics. Therapeutic Exercise: (  10 min):  Exercises per grid below to improve mobility and strength. Required minimal verbal cues to promote proper body alignment and promote proper body mechanics. Progressed resistance, range and repetitions as indicated. Date:  11/11/20 Date:   Date:     Activity/Exercise Parameters Parameters Parameters   LAQ/TKE x15 B     marching x15 B     STS x5                                   Braces/Orthotics/Lines/Etc:   · IV  Treatment/Session Assessment:    · Response to Treatment:  See above  · Interdisciplinary Collaboration:   o Physical Therapist  o Registered Nurse  · After treatment position/precautions:   o Up in chair  o Bed/Chair-wheels locked  o Bed in low position  o Call light within reach  o RN notified   · Compliance with Program/Exercises: Will assess as treatment progresses  · Recommendations/Intent for next treatment session: \"Next visit will focus on advancements to more challenging activities\".   Total Treatment Duration:  PT Patient Time In/Time Out  Time In: 0918  Time Out: Seth 112, PTA

## 2020-11-12 ENCOUNTER — PATIENT OUTREACH (OUTPATIENT)
Dept: CASE MANAGEMENT | Age: 75
End: 2020-11-12

## 2020-11-12 ENCOUNTER — HOME CARE VISIT (OUTPATIENT)
Dept: SCHEDULING | Facility: HOME HEALTH | Age: 75
End: 2020-11-12
Payer: MEDICARE

## 2020-11-12 PROCEDURE — 3331090002 HH PPS REVENUE DEBIT

## 2020-11-12 PROCEDURE — G0151 HHCP-SERV OF PT,EA 15 MIN: HCPCS

## 2020-11-12 PROCEDURE — 400013 HH SOC

## 2020-11-12 PROCEDURE — 3331090001 HH PPS REVENUE CREDIT

## 2020-11-12 NOTE — PROGRESS NOTES
Patient was admitted to [facility] on 11/10/2020 and discharged on 2020  for Arthritis of Right Knee. Outreach made within 2 business days of discharge: Yes    Top Discharge Challenges to be reviewed by the provider   Additional needs identified to be addressed with provider no  none  Discussed COVID-19 related testing which was available at this time. Test results were negative. Patient informed of results, if available? yes   Method of communication with provider : none       Advance Care Planning:   Does patient have an Advance Directive:  yes; reviewed and current     Inpatient Readmission Risk score: 4  Was this a readmission? no   Patient stated reason for the admission: none  Patients top risk factors for readmission: Arthritis of Rt Knee  Interventions to address risk factors:     Care Coordinator(CC) contacted the patient by telephone to perform post hospital discharge assessment. Verified name and  with patient as identifiers. Provided introduction to self, and explanation of the CC. CC reviewed discharge instructions, medical action plan and red flags with patient who verbalized understanding. Patient given an opportunity to ask questions and does not have any further questions or concerns at this time. The patient agrees to contact the PCP office for questions related to their healthcare. Medication reconciliation was performed with patient, who verbalizes understanding of administration of home medications. Advised obtaining a 90-day supply of all daily and as-needed medications. Referral to Pharm D needed: no     Home Health/Outpatient orders at discharge: [address]: [facility]  Date of initial visit: 2020    Durable Medical Equipment ordered at discharge: none  [address]:   Durable Medical Equipment received:     Covid Risk Education    Patient has following risk factors of: Arthritis of Rt Knee.  Education provided regarding infection prevention, and signs and symptoms of COVID-19 and when to seek medical attention with patient who verbalized understanding. Discussed exposure protocols and quarantine From CDC: Are you at higher risk for severe illness?  and given an opportunity for questions and concerns. The patient agrees to contact the COVID-19 hotline [phone] or PCP office for questions related to COVID-19. For more information on steps you can take to protect yourself, see CDC's How to Protect Yourself     Patient/family/caregiver given information for Katherine Heck and agrees to enroll no  Patient's preferred e-mail: declines  Patient's preferred phone number: declines    Discussed follow-up appointments. If no appointment was previously scheduled, appointment scheduling offered: [facility] follow up appointment(s): No future appointments. Non-[facility] follow up appointment(s): Babs Rutledge 11/25/2020  Plan f/u call in 21 days   based on severity of symptoms and risk factors. CC provided contact information for future needs.     Goals Addressed                 This Visit's Progress     Attends follow up appointments on schedule        Takes Medications as prescribed

## 2020-11-13 ENCOUNTER — HOME CARE VISIT (OUTPATIENT)
Dept: SCHEDULING | Facility: HOME HEALTH | Age: 75
End: 2020-11-13
Payer: MEDICARE

## 2020-11-13 VITALS
RESPIRATION RATE: 17 BRPM | SYSTOLIC BLOOD PRESSURE: 134 MMHG | HEART RATE: 78 BPM | TEMPERATURE: 97.7 F | SYSTOLIC BLOOD PRESSURE: 128 MMHG | RESPIRATION RATE: 17 BRPM | DIASTOLIC BLOOD PRESSURE: 76 MMHG | HEART RATE: 83 BPM | DIASTOLIC BLOOD PRESSURE: 76 MMHG | TEMPERATURE: 97.3 F

## 2020-11-13 PROCEDURE — 3331090001 HH PPS REVENUE CREDIT

## 2020-11-13 PROCEDURE — 3331090002 HH PPS REVENUE DEBIT

## 2020-11-13 PROCEDURE — G0151 HHCP-SERV OF PT,EA 15 MIN: HCPCS

## 2020-11-14 PROCEDURE — 3331090001 HH PPS REVENUE CREDIT

## 2020-11-14 PROCEDURE — 3331090002 HH PPS REVENUE DEBIT

## 2020-11-15 PROCEDURE — 3331090001 HH PPS REVENUE CREDIT

## 2020-11-15 PROCEDURE — 3331090002 HH PPS REVENUE DEBIT

## 2020-11-16 ENCOUNTER — HOME CARE VISIT (OUTPATIENT)
Dept: SCHEDULING | Facility: HOME HEALTH | Age: 75
End: 2020-11-16
Payer: MEDICARE

## 2020-11-16 PROCEDURE — G0151 HHCP-SERV OF PT,EA 15 MIN: HCPCS

## 2020-11-16 PROCEDURE — 3331090001 HH PPS REVENUE CREDIT

## 2020-11-16 PROCEDURE — 3331090002 HH PPS REVENUE DEBIT

## 2020-11-17 PROCEDURE — 3331090002 HH PPS REVENUE DEBIT

## 2020-11-17 PROCEDURE — 3331090001 HH PPS REVENUE CREDIT

## 2020-11-18 ENCOUNTER — HOME CARE VISIT (OUTPATIENT)
Dept: SCHEDULING | Facility: HOME HEALTH | Age: 75
End: 2020-11-18
Payer: MEDICARE

## 2020-11-18 VITALS
DIASTOLIC BLOOD PRESSURE: 82 MMHG | HEART RATE: 78 BPM | TEMPERATURE: 97.3 F | RESPIRATION RATE: 17 BRPM | SYSTOLIC BLOOD PRESSURE: 134 MMHG

## 2020-11-18 PROCEDURE — 3331090002 HH PPS REVENUE DEBIT

## 2020-11-18 PROCEDURE — G0151 HHCP-SERV OF PT,EA 15 MIN: HCPCS

## 2020-11-18 PROCEDURE — 3331090001 HH PPS REVENUE CREDIT

## 2020-11-19 PROCEDURE — 3331090001 HH PPS REVENUE CREDIT

## 2020-11-19 PROCEDURE — 3331090002 HH PPS REVENUE DEBIT

## 2020-11-20 ENCOUNTER — HOME CARE VISIT (OUTPATIENT)
Dept: SCHEDULING | Facility: HOME HEALTH | Age: 75
End: 2020-11-20
Payer: MEDICARE

## 2020-11-20 PROCEDURE — 3331090001 HH PPS REVENUE CREDIT

## 2020-11-20 PROCEDURE — 3331090002 HH PPS REVENUE DEBIT

## 2020-11-20 PROCEDURE — G0157 HHC PT ASSISTANT EA 15: HCPCS

## 2020-11-21 VITALS
TEMPERATURE: 97.7 F | HEART RATE: 81 BPM | DIASTOLIC BLOOD PRESSURE: 82 MMHG | RESPIRATION RATE: 17 BRPM | SYSTOLIC BLOOD PRESSURE: 126 MMHG

## 2020-11-21 PROCEDURE — 3331090001 HH PPS REVENUE CREDIT

## 2020-11-21 PROCEDURE — 3331090002 HH PPS REVENUE DEBIT

## 2020-11-22 VITALS
DIASTOLIC BLOOD PRESSURE: 70 MMHG | SYSTOLIC BLOOD PRESSURE: 122 MMHG | TEMPERATURE: 98 F | HEART RATE: 66 BPM | RESPIRATION RATE: 18 BRPM

## 2020-11-22 PROCEDURE — 3331090002 HH PPS REVENUE DEBIT

## 2020-11-22 PROCEDURE — 3331090001 HH PPS REVENUE CREDIT

## 2020-11-23 ENCOUNTER — HOME CARE VISIT (OUTPATIENT)
Dept: HOME HEALTH SERVICES | Facility: HOME HEALTH | Age: 75
End: 2020-11-23
Payer: MEDICARE

## 2020-11-23 ENCOUNTER — HOME CARE VISIT (OUTPATIENT)
Dept: SCHEDULING | Facility: HOME HEALTH | Age: 75
End: 2020-11-23
Payer: MEDICARE

## 2020-11-23 PROCEDURE — G0157 HHC PT ASSISTANT EA 15: HCPCS

## 2020-11-23 PROCEDURE — 3331090001 HH PPS REVENUE CREDIT

## 2020-11-23 PROCEDURE — 3331090002 HH PPS REVENUE DEBIT

## 2020-11-24 VITALS
DIASTOLIC BLOOD PRESSURE: 62 MMHG | RESPIRATION RATE: 18 BRPM | HEART RATE: 58 BPM | SYSTOLIC BLOOD PRESSURE: 128 MMHG | TEMPERATURE: 97.3 F

## 2020-11-24 PROCEDURE — 3331090001 HH PPS REVENUE CREDIT

## 2020-11-24 PROCEDURE — 3331090002 HH PPS REVENUE DEBIT

## 2020-11-25 ENCOUNTER — HOSPITAL ENCOUNTER (OUTPATIENT)
Dept: LAB | Age: 75
Discharge: HOME | End: 2020-11-25
Payer: MEDICARE

## 2020-11-25 ENCOUNTER — HOME CARE VISIT (OUTPATIENT)
Dept: SCHEDULING | Facility: HOME HEALTH | Age: 75
End: 2020-11-25
Payer: MEDICARE

## 2020-11-25 LAB — HGB BLD-MCNC: 12.3 G/DL (ref 11.7–15.4)

## 2020-11-25 PROCEDURE — 36415 COLL VENOUS BLD VENIPUNCTURE: CPT

## 2020-11-25 PROCEDURE — G0151 HHCP-SERV OF PT,EA 15 MIN: HCPCS

## 2020-11-25 PROCEDURE — 3331090002 HH PPS REVENUE DEBIT

## 2020-11-25 PROCEDURE — 3331090001 HH PPS REVENUE CREDIT

## 2020-11-25 PROCEDURE — 85018 HEMOGLOBIN: CPT

## 2020-11-27 VITALS
TEMPERATURE: 97.1 F | HEART RATE: 78 BPM | SYSTOLIC BLOOD PRESSURE: 128 MMHG | DIASTOLIC BLOOD PRESSURE: 78 MMHG | RESPIRATION RATE: 17 BRPM

## 2022-03-18 PROBLEM — G47.00 INSOMNIA: Status: ACTIVE | Noted: 2020-09-25

## 2022-03-18 PROBLEM — M17.11 ARTHRITIS OF RIGHT KNEE: Status: ACTIVE | Noted: 2020-11-10

## 2022-03-19 PROBLEM — I48.20 CHRONIC ATRIAL FIBRILLATION (HCC): Status: ACTIVE | Noted: 2018-12-12

## 2022-03-20 PROBLEM — M17.0 PRIMARY OSTEOARTHRITIS OF BOTH KNEES: Status: ACTIVE | Noted: 2020-09-25

## 2022-08-18 DIAGNOSIS — E03.9 ACQUIRED HYPOTHYROIDISM: Primary | ICD-10-CM

## 2022-08-18 RX ORDER — LEVOTHYROXINE SODIUM 0.05 MG/1
50 TABLET ORAL
Qty: 30 TABLET | Refills: 0 | Status: SHIPPED | OUTPATIENT
Start: 2022-08-18 | End: 2022-11-02 | Stop reason: SDUPTHER

## 2022-11-02 ENCOUNTER — TELEPHONE (OUTPATIENT)
Dept: FAMILY MEDICINE CLINIC | Facility: CLINIC | Age: 77
End: 2022-11-02

## 2022-11-02 DIAGNOSIS — E03.9 ACQUIRED HYPOTHYROIDISM: ICD-10-CM

## 2022-11-02 RX ORDER — LEVOTHYROXINE SODIUM 0.05 MG/1
50 TABLET ORAL
Qty: 30 TABLET | Refills: 1 | Status: SHIPPED | OUTPATIENT
Start: 2022-11-02 | End: 2022-11-10 | Stop reason: SDUPTHER

## 2022-11-02 NOTE — TELEPHONE ENCOUNTER
Patient's  was in the office with me yesterday for an appointment. He requested a refill for his wife's Synthroid. In review of her chart it looks like she has not been seen by Dr. Thang Barber in more than 2 years. I will give her a refill on the Synthroid but she needs an office visit so that labs can be checked. Please notify patient and have her schedule in the next month.

## 2022-11-10 ENCOUNTER — NURSE ONLY (OUTPATIENT)
Dept: FAMILY MEDICINE CLINIC | Facility: CLINIC | Age: 77
End: 2022-11-10

## 2022-11-10 ENCOUNTER — OFFICE VISIT (OUTPATIENT)
Dept: FAMILY MEDICINE CLINIC | Facility: CLINIC | Age: 77
End: 2022-11-10
Payer: MEDICARE

## 2022-11-10 VITALS
RESPIRATION RATE: 18 BRPM | WEIGHT: 175 LBS | TEMPERATURE: 98.2 F | SYSTOLIC BLOOD PRESSURE: 154 MMHG | HEART RATE: 55 BPM | BODY MASS INDEX: 32.2 KG/M2 | OXYGEN SATURATION: 97 % | HEIGHT: 62 IN | DIASTOLIC BLOOD PRESSURE: 73 MMHG

## 2022-11-10 DIAGNOSIS — E78.2 MIXED HYPERLIPIDEMIA: ICD-10-CM

## 2022-11-10 DIAGNOSIS — G89.29 CHRONIC LEFT-SIDED LOW BACK PAIN WITHOUT SCIATICA: ICD-10-CM

## 2022-11-10 DIAGNOSIS — Z00.00 MEDICARE ANNUAL WELLNESS VISIT, SUBSEQUENT: Primary | ICD-10-CM

## 2022-11-10 DIAGNOSIS — E03.9 ACQUIRED HYPOTHYROIDISM: ICD-10-CM

## 2022-11-10 DIAGNOSIS — M54.50 CHRONIC LEFT-SIDED LOW BACK PAIN WITHOUT SCIATICA: ICD-10-CM

## 2022-11-10 DIAGNOSIS — I10 PRIMARY HYPERTENSION: ICD-10-CM

## 2022-11-10 DIAGNOSIS — Z12.31 VISIT FOR SCREENING MAMMOGRAM: ICD-10-CM

## 2022-11-10 DIAGNOSIS — G47.00 INSOMNIA, UNSPECIFIED TYPE: ICD-10-CM

## 2022-11-10 DIAGNOSIS — Z23 NEED FOR PROPHYLACTIC VACCINATION AND INOCULATION AGAINST VARICELLA: ICD-10-CM

## 2022-11-10 DIAGNOSIS — Z78.0 ASYMPTOMATIC MENOPAUSAL STATE: ICD-10-CM

## 2022-11-10 PROBLEM — M17.11 ARTHRITIS OF RIGHT KNEE: Status: RESOLVED | Noted: 2020-11-10 | Resolved: 2022-11-10

## 2022-11-10 PROBLEM — I48.20 CHRONIC ATRIAL FIBRILLATION (HCC): Status: RESOLVED | Noted: 2018-12-12 | Resolved: 2022-11-10

## 2022-11-10 LAB
ALBUMIN SERPL-MCNC: 4.2 G/DL (ref 3.2–4.6)
ALBUMIN/GLOB SERPL: 1.2 {RATIO} (ref 0.4–1.6)
ALP SERPL-CCNC: 84 U/L (ref 50–136)
ALT SERPL-CCNC: 19 U/L (ref 12–65)
ANION GAP SERPL CALC-SCNC: 1 MMOL/L (ref 2–11)
AST SERPL-CCNC: 18 U/L (ref 15–37)
BASOPHILS # BLD: 0.1 K/UL (ref 0–0.2)
BASOPHILS NFR BLD: 1 % (ref 0–2)
BILIRUB SERPL-MCNC: 2.4 MG/DL (ref 0.2–1.1)
BUN SERPL-MCNC: 7 MG/DL (ref 8–23)
CALCIUM SERPL-MCNC: 10.1 MG/DL (ref 8.3–10.4)
CHLORIDE SERPL-SCNC: 105 MMOL/L (ref 101–110)
CHOLEST SERPL-MCNC: 177 MG/DL
CO2 SERPL-SCNC: 31 MMOL/L (ref 21–32)
CREAT SERPL-MCNC: 0.9 MG/DL (ref 0.6–1)
DIFFERENTIAL METHOD BLD: NORMAL
EOSINOPHIL # BLD: 0.2 K/UL (ref 0–0.8)
EOSINOPHIL NFR BLD: 3 % (ref 0.5–7.8)
ERYTHROCYTE [DISTWIDTH] IN BLOOD BY AUTOMATED COUNT: 13.3 % (ref 11.9–14.6)
GLOBULIN SER CALC-MCNC: 3.4 G/DL (ref 2.8–4.5)
GLUCOSE SERPL-MCNC: 121 MG/DL (ref 65–100)
HCT VFR BLD AUTO: 45.4 % (ref 35.8–46.3)
HDLC SERPL-MCNC: 46 MG/DL (ref 40–60)
HDLC SERPL: 3.8 {RATIO}
HGB BLD-MCNC: 15 G/DL (ref 11.7–15.4)
IMM GRANULOCYTES # BLD AUTO: 0 K/UL (ref 0–0.5)
IMM GRANULOCYTES NFR BLD AUTO: 0 % (ref 0–5)
LDLC SERPL CALC-MCNC: 94.4 MG/DL
LYMPHOCYTES # BLD: 2 K/UL (ref 0.5–4.6)
LYMPHOCYTES NFR BLD: 29 % (ref 13–44)
MCH RBC QN AUTO: 32.7 PG (ref 26.1–32.9)
MCHC RBC AUTO-ENTMCNC: 33 G/DL (ref 31.4–35)
MCV RBC AUTO: 98.9 FL (ref 82–102)
MONOCYTES # BLD: 0.6 K/UL (ref 0.1–1.3)
MONOCYTES NFR BLD: 9 % (ref 4–12)
NEUTS SEG # BLD: 4.1 K/UL (ref 1.7–8.2)
NEUTS SEG NFR BLD: 58 % (ref 43–78)
NRBC # BLD: 0 K/UL (ref 0–0.2)
PLATELET # BLD AUTO: 295 K/UL (ref 150–450)
PMV BLD AUTO: 10.1 FL (ref 9.4–12.3)
POTASSIUM SERPL-SCNC: 5.3 MMOL/L (ref 3.5–5.1)
PROT SERPL-MCNC: 7.6 G/DL (ref 6.3–8.2)
RBC # BLD AUTO: 4.59 M/UL (ref 4.05–5.2)
SODIUM SERPL-SCNC: 137 MMOL/L (ref 133–143)
TRIGL SERPL-MCNC: 183 MG/DL (ref 35–150)
TSH, 3RD GENERATION: 2.38 UIU/ML (ref 0.36–3.74)
VLDLC SERPL CALC-MCNC: 36.6 MG/DL (ref 6–23)
WBC # BLD AUTO: 7.1 K/UL (ref 4.3–11.1)

## 2022-11-10 PROCEDURE — G8399 PT W/DXA RESULTS DOCUMENT: HCPCS | Performed by: FAMILY MEDICINE

## 2022-11-10 PROCEDURE — G8417 CALC BMI ABV UP PARAM F/U: HCPCS | Performed by: FAMILY MEDICINE

## 2022-11-10 PROCEDURE — 1036F TOBACCO NON-USER: CPT | Performed by: FAMILY MEDICINE

## 2022-11-10 PROCEDURE — 3077F SYST BP >= 140 MM HG: CPT | Performed by: FAMILY MEDICINE

## 2022-11-10 PROCEDURE — G8484 FLU IMMUNIZE NO ADMIN: HCPCS | Performed by: FAMILY MEDICINE

## 2022-11-10 PROCEDURE — 99214 OFFICE O/P EST MOD 30 MIN: CPT | Performed by: FAMILY MEDICINE

## 2022-11-10 PROCEDURE — G8427 DOCREV CUR MEDS BY ELIG CLIN: HCPCS | Performed by: FAMILY MEDICINE

## 2022-11-10 PROCEDURE — 3078F DIAST BP <80 MM HG: CPT | Performed by: FAMILY MEDICINE

## 2022-11-10 PROCEDURE — 1090F PRES/ABSN URINE INCON ASSESS: CPT | Performed by: FAMILY MEDICINE

## 2022-11-10 PROCEDURE — G0439 PPPS, SUBSEQ VISIT: HCPCS | Performed by: FAMILY MEDICINE

## 2022-11-10 PROCEDURE — 1123F ACP DISCUSS/DSCN MKR DOCD: CPT | Performed by: FAMILY MEDICINE

## 2022-11-10 RX ORDER — ZOLPIDEM TARTRATE 5 MG/1
5 TABLET ORAL NIGHTLY PRN
Qty: 30 TABLET | Refills: 2 | Status: SHIPPED | OUTPATIENT
Start: 2022-11-10 | End: 2023-05-09

## 2022-11-10 RX ORDER — LEVOTHYROXINE SODIUM 0.05 MG/1
50 TABLET ORAL
Qty: 90 TABLET | Refills: 3 | Status: SHIPPED | OUTPATIENT
Start: 2022-11-10

## 2022-11-10 ASSESSMENT — LIFESTYLE VARIABLES
HOW OFTEN DO YOU HAVE A DRINK CONTAINING ALCOHOL: NEVER
HOW MANY STANDARD DRINKS CONTAINING ALCOHOL DO YOU HAVE ON A TYPICAL DAY: PATIENT DOES NOT DRINK

## 2022-11-10 ASSESSMENT — PATIENT HEALTH QUESTIONNAIRE - PHQ9
SUM OF ALL RESPONSES TO PHQ QUESTIONS 1-9: 2
SUM OF ALL RESPONSES TO PHQ9 QUESTIONS 1 & 2: 2
SUM OF ALL RESPONSES TO PHQ QUESTIONS 1-9: 2
SUM OF ALL RESPONSES TO PHQ QUESTIONS 1-9: 2
1. LITTLE INTEREST OR PLEASURE IN DOING THINGS: 1
2. FEELING DOWN, DEPRESSED OR HOPELESS: 1
SUM OF ALL RESPONSES TO PHQ QUESTIONS 1-9: 2

## 2022-11-10 NOTE — PROGRESS NOTES
Medicare Annual Wellness Visit    [address] is here for Medicare AWV    Assessment & Plan   Medicare annual wellness visit, subsequent  Asymptomatic menopausal state  -     DEXA Bone Density Axial Skeleton; Future  Visit for screening mammogram  -     ELIAZAR DIGITAL SCREEN W OR WO CAD BILATERAL; Future  Need for prophylactic vaccination and inoculation against varicella  -     zoster recombinant adjuvanted vaccine [facility]) 50 MCG/0.5ML SUSR injection; Inject 0.5 mLs into the muscle once for 1 dose, Disp-0.5 mL, R-0Print  Discussed with patient in detail Medicare subsequent annual wellness exam.  Wellness/anticipatory guidance information provided. Information on advanced directives discussed and printed. Shingles vaccine potential benefit discussed with prescription(s) printed; should discuss with pharmacy potential cost(s). Patient declined COVID-19 vaccine. Patient declined flu vaccine. Patient declined pneumonia vaccine. Benefits discussed. Patient was referred for mammogram and DEXA scan. MAGNOLIA signed for [facility]. Fall precautions provided. Recommendations for Preventive Services Due: see orders and patient instructions/AVS.  Recommended screening schedule for the next 5-10 years is provided to the patient in written form: see Patient Instructions/AVS.     Return in 2 months (on 1/10/2023) for recheck hip and mini mental status. Patient's complete Health Risk Assessment and screening values have been reviewed and are found in Flowsheets. The following problems were reviewed today and where indicated follow up appointments were made and/or referrals ordered. Positive Risk Factor Screenings with Interventions:     Cognitive:   Words recalled: 1 Word Recalled  Total Score Interpretation: Abnormal Mini-Cog  Did the patient refuse to take the cognition test?: No  Cognitive Impairment Interventions:  Do full mini mental status at follow up           [facility] and ACP:  General  In general, how would you say your health is?: Fair  In the past 7 days, have you experienced any of the following: New or Increased Pain, New or Increased Fatigue, Loneliness, Social Isolation, Stress or Anger?: No  Do you get the social and emotional support that you need?: Yes  Do you have a Living Will?: (!) No    Advance Directives       Power of [address] Will ACP-Advance Directive ACP-Power of     Not on File Not on File Not on File Not on File          General Health Risk Interventions:  No Living Will: [address] addressed with patient today    Health Habits/Nutrition:  Physical Activity: Insufficiently Active    Days of Exercise per Week: 2 days    Minutes of Exercise per Session: 20 min     Have you lost any weight without trying in the past 3 months?: No  Body mass index: (!) 32  Have you seen the dentist within the past year?: (!) No  Health Habits/Nutrition Interventions:  Dental exam overdue:  patient encouraged to make appointment with his/her dentist     Safety:  Do you have working smoke detectors?: Yes  Do you have any tripping hazards - loose or unsecured carpets or rugs?: No  Do you have any tripping hazards - clutter in doorways, halls, or stairs?: No  Do you have either shower bars, grab bars, non-slip mats or non-slip surfaces in your shower or bathtub?: (!) No  Do all of your stairways have a railing or banister?: Yes  Do you always fasten your seatbelt when you are in a car?: Yes  Safety Interventions:  Home safety tips provided        Immunization History   Administered Date(s) Administered    Pneumococcal Conjugate 13-valent ([id number]) 11/19/2015          Objective   Vitals:    11/10/22 0947 11/10/22 1102   BP: (!) 176/73 (!) 154/73   Pulse: 54 55   Resp: 18    Temp: 98.2 °F (36.8 °C)    TempSrc: Temporal    SpO2: 97%    Weight: 175 lb (79.4 kg)    Height: 5' 2\" (1.575 m)       Body mass index is 32.01 kg/m².             No Known Allergies  Prior to Visit Medications    Medication Sig Taking? Authorizing Provider   levothyroxine (SYNTHROID) 50 MCG tablet Take 1 tablet by mouth every morning (before breakfast) Yes Gregorio Roberts MD   zolpidem (AMBIEN) 5 MG tablet Take 1 tablet by mouth nightly as needed for Sleep for up to 180 days. Yes Gregorio Roberts MD   zoster recombinant adjuvanted vaccine [facility]) 50 MCG/0.5ML SUSR injection Inject 0.5 mLs into the muscle once for 1 dose Yes Gregorio Roberts MD   amLODIPine (NORVASC) 10 MG tablet Take 5 mg by mouth daily Yes Ar Automatic Reconciliation   apixaban (ELIQUIS) 5 MG TABS tablet Take 5 mg by mouth 2 times daily Yes Ar Automatic Reconciliation   atorvastatin (LIPITOR) 40 MG tablet Take 40 mg by mouth Yes Ar Automatic Reconciliation   benazepril (LOTENSIN) 40 MG tablet Take 40 mg by mouth daily Yes Ar Automatic Reconciliation   furosemide (LASIX) 40 MG tablet Take 40 mg by mouth daily Yes Ar Automatic Reconciliation   potassium chloride (KLOR-CON) 10 MEQ extended release tablet Take 20 mEq by mouth daily Yes Ar Automatic Reconciliation   sotalol (BETAPACE) 80 MG tablet Take 160 mg by mouth 2 times daily Yes Ar Automatic Reconciliation       CareTeam (Including outside providers/suppliers regularly involved in providing care):   Patient Care Team:  Gregorio Roberts MD as PCP - [facility]  Gregorio Roberts MD as PCP - [facility] Empaneled Provider     Reviewed and updated this visit:  Tobacco  Allergies  Meds  Problems  Med Hx  Surg Hx  Soc Hx  Fam Hx        [facility] [address]  Phone: [phone] Fax [phone]  Lashawn Camacho. Ashish Rowley M.D.  11/10/2022        Phill Willams is a 69 y.o. female     HPI:  Patient is seen for fasting follow-up. States that she has had lift low back/hip pain since having had her right knee replaced. States that her  feels a knot at this site.   Stays sore and this makes it difficult to get up and down from a seated position. No radicular pain reported. States that with her 's illness, she does not sleep well at night often. She describes some decreased mood and anxiety associated with this. She has taken Ambien 5 mg in the past with good benefit and would like to try this again as needed. She denies any thoughts of harming herself or others. Does not desire other medication for treatment of anxiety/depression. Does not desire counseling. Patient also states that at home her blood pressures in the 120s over 70s. States that when she sees her cardiologist or comes here, she will have elevated blood pressure. She does take her Lasix and potassium daily but did not take it yet this morning. ROS:  Denies any chest pain dyspnea diaphoresis or persistent edema. No reported palpitations or tachycardia. No polydipsia or polyphagia or polyuria. No large weight fluctuations. No dyspepsia or reflux. No abdominal pain. No hematochezia melena diarrhea or constipation. No dysuria or hematuria. No recent falls.     No Known Allergies    Active Ambulatory Problems     Diagnosis Date Noted    Insomnia 09/25/2020    Menopause 11/07/2012    Acquired hypothyroidism 11/03/2014    HTN (hypertension) 11/07/2012    HLD (hyperlipidemia) 11/07/2012    Asthma 11/07/2012    Primary osteoarthritis of both knees 09/25/2020    Chronic left-sided low back pain without sciatica 11/10/2022     Resolved Ambulatory Problems     Diagnosis Date Noted    Arthritis of right knee 11/10/2020    Chronic atrial fibrillation ([facility] [address] 12/12/2018     Past Medical History:   Diagnosis Date    Atrial fibrillation (HCC)     CAD (coronary artery disease)     Cataract 11/24/2020    H/O cardiac radiofrequency ablation 11/23/2020    History of total knee replacement     Hypercholesterolemia     Thyroid disease         Past Surgical History:   Procedure Laterality Date    ABLATION OF DYSRHYTHMIC FOCUS  06/29/2020     [place]    COLONOSCOPY      GI      rectocele    KNEE ARTHROPLASTY Right 11/10/2020    Dr. Mike Real (CERVIX REMOVED)      TONSILLECTOMY          Social History     Tobacco Use    Smoking status: Never    Smokeless tobacco: Never   Substance Use Topics    Alcohol use: No    Drug use: No       Physical Exam:  Blood pressure (!) 154/73, pulse 55, temperature 98.2 °F (36.8 °C), temperature source Temporal, resp. rate 18, height 5' 2\" (1.575 m), weight 175 lb (79.4 kg), SpO2 97 %. Pleasant female in no apparent distress. Affect is mildly anxious. Lower eyelids are not significantly pale. HEENT grossly normal.  Oral mucosa is moist and intact. No cervical lymphadenopathy or thyromegaly or nodularity. Lungs clear. No JVD or hepatojugular reflux. Cardiovascular regular S1-S2 without murmurs rubs or gallops. Radial pulses 2+. Abdomen is soft, moderately obese, and nontender with positive bowel sounds. No masses palpated. No pitting peripheral edema or cyanosis. No midline spinal tenderness today. She does have some left mild sacroiliac tenderness with some overlying musculature tightness. Able to stand from a seated position although slowly. Able to flex and extend her left thigh at her hip and extend her lower leg at the knee. Neuro grossly intact. Assessment and Plan:  5. Chronic left-sided low back pain without sciatica        6. Insomnia, unspecified type  zolpidem (AMBIEN) 5 MG tablet      7. Acquired hypothyroidism  levothyroxine (SYNTHROID) 50 MCG tablet    TSH      8. Primary hypertension  CBC with Auto Differential    Comprehensive Metabolic Panel    Lipid Panel      9. Mixed hyperlipidemia  Lipid Panel        Information on back care basics, low back pain exercises, sacroiliac pain exercises, yoga and sleeping better, and information on hypothyroidism, hypertension, and hyperlipidemia provided.   Patient will try the exercises mentioned and follow-up here in 2 months with consideration for left hip and low back films if needed. Also at that time we will need to do a full Mini-Mental status exam based on findings above. Did refill her Ambien as needed with patient having tolerated this in the past.  Substance abuse policy signed. Await fasting labs as above. Refilled her thyroid medicine. States that her cardiologist is refilled her other medications essentially. Encouraged 150 minutes of low impact aerobic activity weekly. Also encouraged resistance training every other day with 24-48 hours of muscle glucose uptake subsequently. She should monitor blood pressure and make sure it is staying less than 140/90 by checking it in the morning and in the evening on occasion. Spent 30 minutes with patient today in examination, evaluation, and documentation in addition to her wellness exam.    Discharge Meds:  Current Outpatient Medications   Medication Sig Dispense Refill    levothyroxine (SYNTHROID) 50 MCG tablet Take 1 tablet by mouth every morning (before breakfast) 90 tablet 3    zolpidem (AMBIEN) 5 MG tablet Take 1 tablet by mouth nightly as needed for Sleep for up to 180 days. 30 tablet 2    zoster recombinant adjuvanted vaccine (SHINGRIX) 50 MCG/0.5ML SUSR injection Inject 0.5 mLs into the muscle once for 1 dose 0.5 mL 0    amLODIPine (NORVASC) 10 MG tablet Take 5 mg by mouth daily      apixaban (ELIQUIS) 5 MG TABS tablet Take 5 mg by mouth 2 times daily      atorvastatin (LIPITOR) 40 MG tablet Take 40 mg by mouth      benazepril (LOTENSIN) 40 MG tablet Take 40 mg by mouth daily      furosemide (LASIX) 40 MG tablet Take 40 mg by mouth daily      potassium chloride (KLOR-CON) 10 MEQ extended release tablet Take 20 mEq by mouth daily      sotalol (BETAPACE) 80 MG tablet Take 160 mg by mouth 2 times daily       No current facility-administered medications for this visit. Return in 2 months (on 1/10/2023) for recheck hip and mini mental status.         Any new medications were explained today including any potential drug interactions or significant side effects. The patient's questions in regards to this were answered today. Dictated using voice recognition software.   Proofread, but unrecognized errors may exist.

## 2022-11-10 NOTE — PATIENT INSTRUCTIONS
Learning About Low-Carbohydrate Diets  What is a low-carbohydrate diet? A low-carbohydrate (or \"low-carb\") diet limits foods and drinks that have carbohydrates. This includes grains, fruits, milk and yogurt, and starchy vegetables like potatoes, beans, and corn. It also avoids foods and drinks that have added sugar. Instead, low-carb diets include foods that are high in protein and fat. Why might you follow a low-carb diet? Low-carb diets may be used for a variety of reasons, such as for weight loss. People who have diabetes may use a low-carb diet to help manage their blood sugar levels. What should you do before you start the diet? Talk to your doctor before you try any diet. This is even more important if you have health problems like kidney disease, heart disease, or diabetes. Your doctor may suggest that you meet with a registered dietitian. A dietitian can help you make an eating plan that works for you. What foods do you eat on a low-carb diet? On a low-carb diet, you choose foods that are high in protein and fat. Examples of these are:  Meat, poultry, and fish. Eggs. Nuts, such as walnuts, pecans, almonds, and peanuts. Peanut butter and other nut butters. Tofu. Avocado. Pat Alma Rosa. Non-starchy vegetables like broccoli, cauliflower, green beans, mushrooms, peppers, lettuce, and spinach. Unsweetened non-dairy milks like almond milk and coconut milk. Cheese, cottage cheese, and cream cheese. Where can you learn more? Go to [email or web address] org and sign in to your inDplay account. Enter C335 in the [facility] box to learn more about \"Learning About Low-Carbohydrate Diets. \"     If you do not have an account, please click on the \"Sign Up Now\" link. Current as of: May 9, 2022               Content Version: 13.4  © 2530-2420 Healthwise, Incorporated. Care instructions adapted under license by [facility] ([facility]).  If you have questions about a medical condition or this instruction, always ask your healthcare professional. [place] [address] any warranty or liability for your use of this information. Personalized Preventive Plan for Dez Perry Pepper - 11/10/2022  Medicare offers a range of preventive health benefits. Some of the tests and screenings are paid in full while other may be subject to a deductible, co-insurance, and/or copay. Some of these benefits include a comprehensive review of your medical history including lifestyle, illnesses that may run in your family, and various assessments and screenings as appropriate. After reviewing your medical record and screening and assessments performed today your provider may have ordered immunizations, labs, imaging, and/or referrals for you. A list of these orders (if applicable) as well as your Preventive Care list are included within your After Visit Summary for your review. Other Preventive Recommendations:    A preventive eye exam performed by an eye specialist is recommended every 1-2 years to screen for glaucoma; cataracts, macular degeneration, and other eye disorders. A preventive dental visit is recommended every 6 months. Try to get at least 150 minutes of exercise per week or 10,000 steps per day on a pedometer . Order or download the FREE \"Exercise & Physical Activity: Your Everyday Guide\" from [employer]. Call 5-306.726.9251 or search [employer] online. You need 5603-5271 mg of calcium and 5261-8559 IU of vitamin D per day. It is possible to meet your calcium requirement with diet alone, but a vitamin D supplement is usually necessary to meet this goal.  When exposed to the sun, use a sunscreen that protects against both UVA and UVB radiation with an SPF of 30 or greater. Reapply every 2 to 3 hours or after sweating, drying off with a towel, or swimming. Always wear a seat belt when traveling in a car.  Always wear a helmet when riding a bicycle or motorcycle.

## 2022-11-15 ENCOUNTER — TELEPHONE (OUTPATIENT)
Dept: FAMILY MEDICINE CLINIC | Facility: CLINIC | Age: 77
End: 2022-11-15

## 2022-11-15 NOTE — TELEPHONE ENCOUNTER
----- Message from Ketan Tena sent at 11/15/2022  9:44 AM EST -----  Subject: Message to Provider    QUESTIONS  Information for Provider? Patient would like the order for the bone   density test faxed to the [facility]. Please call   patient to discuss. If she can't be reached on the home number, call her   cell phone.  ---------------------------------------------------------------------------  --------------  Nicolle Leonard [facility]  [phone]; OK to leave message on voicemail  ---------------------------------------------------------------------------  --------------  SCRIPT ANSWERS  Relationship to Patient?  Self

## 2022-11-15 NOTE — TELEPHONE ENCOUNTER
Faxed to [facility]. Spoke with patient and made her aware they would be in contact with her to schedule, voiced understanding.

## 2022-12-30 ENCOUNTER — HOSPITAL ENCOUNTER (OUTPATIENT)
Dept: MAMMOGRAPHY | Age: 77
Discharge: HOME | End: 2022-12-30
Payer: MEDICARE

## 2022-12-30 DIAGNOSIS — Z12.31 VISIT FOR SCREENING MAMMOGRAM: ICD-10-CM

## 2022-12-30 PROCEDURE — 77067 SCR MAMMO BI INCL CAD: CPT

## 2023-03-16 ENCOUNTER — OFFICE VISIT (OUTPATIENT)
Dept: FAMILY MEDICINE CLINIC | Facility: CLINIC | Age: 78
End: 2023-03-16

## 2023-03-16 VITALS
TEMPERATURE: 97.9 F | OXYGEN SATURATION: 96 % | RESPIRATION RATE: 18 BRPM | SYSTOLIC BLOOD PRESSURE: 137 MMHG | HEART RATE: 62 BPM | DIASTOLIC BLOOD PRESSURE: 72 MMHG | HEIGHT: 62 IN | WEIGHT: 178.3 LBS | BODY MASS INDEX: 32.81 KG/M2

## 2023-03-16 DIAGNOSIS — M85.80 OSTEOPENIA, UNSPECIFIED LOCATION: ICD-10-CM

## 2023-03-16 DIAGNOSIS — G89.29 CHRONIC LEFT HIP PAIN: ICD-10-CM

## 2023-03-16 DIAGNOSIS — M54.50 CHRONIC LEFT-SIDED LOW BACK PAIN WITHOUT SCIATICA: ICD-10-CM

## 2023-03-16 DIAGNOSIS — M25.552 CHRONIC LEFT HIP PAIN: ICD-10-CM

## 2023-03-16 DIAGNOSIS — M51.36 DDD (DEGENERATIVE DISC DISEASE), LUMBAR: ICD-10-CM

## 2023-03-16 DIAGNOSIS — G47.00 INSOMNIA, UNSPECIFIED TYPE: ICD-10-CM

## 2023-03-16 DIAGNOSIS — G89.29 CHRONIC LEFT-SIDED LOW BACK PAIN WITHOUT SCIATICA: ICD-10-CM

## 2023-03-16 DIAGNOSIS — R41.3 MEMORY DIFFICULTIES: Primary | ICD-10-CM

## 2023-03-16 RX ORDER — ZOLPIDEM TARTRATE 5 MG/1
5 TABLET ORAL NIGHTLY PRN
Qty: 21 TABLET | Refills: 5 | Status: SHIPPED | OUTPATIENT
Start: 2023-04-15 | End: 2023-10-12

## 2023-03-16 RX ORDER — GABAPENTIN 300 MG/1
300 CAPSULE ORAL NIGHTLY
Qty: 30 CAPSULE | Refills: 3 | Status: SHIPPED | OUTPATIENT
Start: 2023-03-16 | End: 2023-07-14

## 2023-03-16 SDOH — ECONOMIC STABILITY: FOOD INSECURITY: WITHIN THE PAST 12 MONTHS, YOU WORRIED THAT YOUR FOOD WOULD RUN OUT BEFORE YOU GOT MONEY TO BUY MORE.: NEVER TRUE

## 2023-03-16 SDOH — ECONOMIC STABILITY: FOOD INSECURITY: WITHIN THE PAST 12 MONTHS, THE FOOD YOU BOUGHT JUST DIDN'T LAST AND YOU DIDN'T HAVE MONEY TO GET MORE.: NEVER TRUE

## 2023-03-16 SDOH — ECONOMIC STABILITY: INCOME INSECURITY: HOW HARD IS IT FOR YOU TO PAY FOR THE VERY BASICS LIKE FOOD, HOUSING, MEDICAL CARE, AND HEATING?: NOT HARD AT ALL

## 2023-03-16 SDOH — ECONOMIC STABILITY: HOUSING INSECURITY
IN THE LAST 12 MONTHS, WAS THERE A TIME WHEN YOU DID NOT HAVE A STEADY PLACE TO SLEEP OR SLEPT IN A SHELTER (INCLUDING NOW)?: NO

## 2023-03-16 ASSESSMENT — PATIENT HEALTH QUESTIONNAIRE - PHQ9
1. LITTLE INTEREST OR PLEASURE IN DOING THINGS: 0
2. FEELING DOWN, DEPRESSED OR HOPELESS: 0
SUM OF ALL RESPONSES TO PHQ QUESTIONS 1-9: 0
SUM OF ALL RESPONSES TO PHQ QUESTIONS 1-9: 0
SUM OF ALL RESPONSES TO PHQ9 QUESTIONS 1 & 2: 0
SUM OF ALL RESPONSES TO PHQ QUESTIONS 1-9: 0
SUM OF ALL RESPONSES TO PHQ QUESTIONS 1-9: 0

## 2023-03-16 NOTE — PROGRESS NOTES
[address] [facility]  [address]  Phone: [phone] Fax [phone]  Sachi Gaspar. Teresa Rosenbaum M.D.  3/16/2023        Jacquelynne Kayser is a 69 y.o. female     HPI:  Patient is seen for Follow-up (2 months, hip and mini mental status)  Patient previously had concern for difficulties with memory. Occasionally will forget somebody's name for a time. No specific concerns otherwise. Does want to know what her recent DEXA scan showed. Patient describes recurrent left lower back pain and hip pain such that it has altered her walk. She describes significant discomfort in these areas especially with ambulation. Has noticed this after prior right knee replacement. She continues taking her Ambien 5 mg but has increased it to 5 nights out of 7 to get adequate sleep. Does not have any awakening during the night she does not recall or awaken with sedation. ROS:  Denies any chest pain dyspnea diaphoresis or edema. No reported palpitations or tachycardia. No large weight fluctuations. No abdominal pain. No recent change in voiding or stooling. Denies any depression.      No Known Allergies    Active Ambulatory Problems     Diagnosis Date Noted    Insomnia 09/25/2020    Menopause 11/07/2012    Acquired hypothyroidism 11/03/2014    HTN (hypertension) 11/07/2012    HLD (hyperlipidemia) 11/07/2012    Asthma 11/07/2012    Primary osteoarthritis of both knees 09/25/2020    Chronic left-sided low back pain without sciatica 11/10/2022    Chronic left hip pain 03/16/2023     Resolved Ambulatory Problems     Diagnosis Date Noted    Arthritis of right knee 11/10/2020    Chronic atrial fibrillation ([address] 12/12/2018     Past Medical History:   Diagnosis Date    Atrial fibrillation (HCC)     CAD (coronary artery disease)     Cataract 11/24/2020    H/O cardiac radiofrequency ablation 11/23/2020    History of total knee replacement     Hypercholesterolemia     Thyroid disease Past Surgical History:   Procedure Laterality Date    ABLATION OF DYSRHYTHMIC FOCUS  06/29/2020    Dr. Joy Marrero    COLONOSCOPY      GI      rectocele    KNEE ARTHROPLASTY Right 11/10/2020    Dr. Fredrick Slainas (CERVIX REMOVED)      TONSILLECTOMY          Social History     Tobacco Use    Smoking status: Never    Smokeless tobacco: Never   Substance Use Topics    Alcohol use: No    Drug use: No       Physical Exam:  Blood pressure 137/72, pulse 62, temperature 97.9 °F (36.6 °C), temperature source Temporal, resp. rate 18, height 5' 2\" (1.575 m), weight 178 lb 4.8 oz (80.9 kg), SpO2 96 %. Pleasant female in no apparent distress. Affect is appropriate. HEENT grossly normal.  Oral mucosa is moist and intact. No cervical lymphadenopathy or thyromegaly or nodularity. Lungs clear. Cardiovascular regular S1-S2 without murmurs rubs or gallops. Radial pulses 2+. Abdomen is soft, moderately obese, and nontender with positive bowel sounds. No specific midline spinal tenderness or left sacroiliac tenderness. No ischial tuberosity tenderness or trochanteric tenderness. Able to flex and extend the left thigh at the hip. Able to flex extend the lower leg at the left knee. No peripheral edema or cyanosis. Ambulates with an antalgic gait. Mini-Mental status exam today with patient scoring 27 out of 30 with only missing the season, building type, and copying the design slightly. Results for orders placed or performed in visit on 03/16/23   XR HIP LEFT (2-3 VIEWS)    Impression    2 views of the left hip reveal no fractures. Appropriate joint space is noted. Await radiologic interpretation. XR LUMBAR SPINE (2-3 VIEWS)    Impression    2 views of the lumbar spine reveal significant degenerative changes of the vertebral bodies with lumbar deviation to the patient's right and accentuation of the lumbar lordosis at L3-S1 specifically at L4-L5 and L5-S1. Await radiologic interpretation.        Assessment and Plan:   Diagnosis Orders   1. Memory difficulties        2. Chronic left-sided low back pain without sciatica  XR LUMBAR SPINE (2-3 VIEWS)    External Referral to Physical Therapy      3. Chronic left hip pain  XR HIP LEFT (2-3 VIEWS)    External Referral to Physical Therapy      4. Insomnia, unspecified type  zolpidem (AMBIEN) 5 MG tablet      5. Osteopenia, unspecified location        6. DDD (degenerative disc disease), lumbar  gabapentin (NEURONTIN) 300 MG capsule    External Referral to Physical Therapy        Reviewed with patient her MMSE with her score 27 out of 30. No need for intervention at present. Information on back care basics, stretching exercises, low back pain exercises, sleeping better, and on low bone density provided. Reviewed with patient her recent DEXA scan results. Reviewed with patient her x-ray findings as above. With significant lumbar degenerative changes, refer patient to physical therapy at [facility] in [facility], her preference. Also try patient on 300 mg of gabapentin nightly to see if this helps with discomfort and reduces antalgic gait. May not necessarily need to take with the Ambien with this potentially increasing sedation. Patient warned about this. Plan reassessment in 3 months or more quickly as needed. Refilled her Ambien. Encouraged healthy activity and calcium with vitamin D secondary to her osteopenia. Discharge Meds:  Current Outpatient Medications   Medication Sig Dispense Refill    [START ON 4/15/2023] zolpidem (AMBIEN) 5 MG tablet Take 1 tablet by mouth nightly as needed for Sleep for up to 180 days. 21 tablet 5    gabapentin (NEURONTIN) 300 MG capsule Take 1 capsule by mouth nightly for 120 days.  30 capsule 3    levothyroxine (SYNTHROID) 50 MCG tablet Take 1 tablet by mouth every morning (before breakfast) 90 tablet 3    amLODIPine (NORVASC) 10 MG tablet Take 5 mg by mouth daily      apixaban (ELIQUIS) 5 MG TABS tablet Take 5 mg by mouth 2 times daily      atorvastatin (LIPITOR) 40 MG tablet Take 40 mg by mouth      benazepril (LOTENSIN) 40 MG tablet Take 40 mg by mouth daily      furosemide (LASIX) 40 MG tablet Take 40 mg by mouth daily      potassium chloride (KLOR-CON) 10 MEQ extended release tablet Take 20 mEq by mouth daily      sotalol (BETAPACE) 80 MG tablet Take 160 mg by mouth 2 times daily       No current facility-administered medications for this visit. I have reviewed this patient's report generated by the Prescription Monitoring Program which does not demonstrate aberrancies or inconsistencies with regard to the historical prescribing of controlled medications to this patient by other providers. Return in about 3 months (around 6/16/2023). Any new medications were explained today including any potential drug interactions or significant side effects. The patient's questions in regards to this were answered today. Dictated using voice recognition software.   Proofread, but unrecognized errors may exist.

## 2023-04-06 ENCOUNTER — TELEPHONE (OUTPATIENT)
Dept: FAMILY MEDICINE CLINIC | Facility: CLINIC | Age: 78
End: 2023-04-06

## 2023-04-06 NOTE — TELEPHONE ENCOUNTER
Plan of care from [facility] Physical therapy  to be signed and faxed   placed in tray

## 2023-06-05 ENCOUNTER — TELEPHONE (OUTPATIENT)
Dept: FAMILY MEDICINE CLINIC | Facility: CLINIC | Age: 78
End: 2023-06-05

## 2023-11-16 ENCOUNTER — OFFICE VISIT (OUTPATIENT)
Dept: FAMILY MEDICINE CLINIC | Facility: CLINIC | Age: 78
End: 2023-11-16
Payer: MEDICARE

## 2023-11-16 VITALS
HEART RATE: 56 BPM | TEMPERATURE: 98.1 F | DIASTOLIC BLOOD PRESSURE: 90 MMHG | BODY MASS INDEX: 32.94 KG/M2 | HEIGHT: 62 IN | RESPIRATION RATE: 16 BRPM | WEIGHT: 179 LBS | SYSTOLIC BLOOD PRESSURE: 154 MMHG | OXYGEN SATURATION: 96 %

## 2023-11-16 DIAGNOSIS — E66.09 CLASS 1 OBESITY DUE TO EXCESS CALORIES WITH SERIOUS COMORBIDITY AND BODY MASS INDEX (BMI) OF 32.0 TO 32.9 IN ADULT: ICD-10-CM

## 2023-11-16 DIAGNOSIS — M51.36 DDD (DEGENERATIVE DISC DISEASE), LUMBAR: ICD-10-CM

## 2023-11-16 DIAGNOSIS — G47.00 INSOMNIA, UNSPECIFIED TYPE: ICD-10-CM

## 2023-11-16 DIAGNOSIS — Z00.00 MEDICARE ANNUAL WELLNESS VISIT, SUBSEQUENT: Primary | ICD-10-CM

## 2023-11-16 DIAGNOSIS — E78.2 MIXED HYPERLIPIDEMIA: ICD-10-CM

## 2023-11-16 DIAGNOSIS — I10 PRIMARY HYPERTENSION: ICD-10-CM

## 2023-11-16 DIAGNOSIS — R73.09 ELEVATED GLUCOSE: ICD-10-CM

## 2023-11-16 DIAGNOSIS — G89.29 CHRONIC LEFT-SIDED LOW BACK PAIN WITHOUT SCIATICA: ICD-10-CM

## 2023-11-16 DIAGNOSIS — E87.5 HYPERKALEMIA: ICD-10-CM

## 2023-11-16 DIAGNOSIS — M54.50 CHRONIC LEFT-SIDED LOW BACK PAIN WITHOUT SCIATICA: ICD-10-CM

## 2023-11-16 DIAGNOSIS — E03.9 ACQUIRED HYPOTHYROIDISM: ICD-10-CM

## 2023-11-16 PROBLEM — E66.811 CLASS 1 OBESITY DUE TO EXCESS CALORIES WITH SERIOUS COMORBIDITY AND BODY MASS INDEX (BMI) OF 32.0 TO 32.9 IN ADULT: Status: ACTIVE | Noted: 2023-11-16

## 2023-11-16 LAB
ALBUMIN SERPL-MCNC: 4.1 G/DL (ref 3.2–4.6)
ALBUMIN/GLOB SERPL: 1.1 (ref 0.4–1.6)
ALP SERPL-CCNC: 76 U/L (ref 50–136)
ALT SERPL-CCNC: 20 U/L (ref 12–65)
ANION GAP SERPL CALC-SCNC: 7 MMOL/L (ref 2–11)
AST SERPL-CCNC: 22 U/L (ref 15–37)
BASOPHILS # BLD: 0.1 K/UL (ref 0–0.2)
BASOPHILS NFR BLD: 1 % (ref 0–2)
BILIRUB SERPL-MCNC: 1.4 MG/DL (ref 0.2–1.1)
BUN SERPL-MCNC: 12 MG/DL (ref 8–23)
CALCIUM SERPL-MCNC: 9 MG/DL (ref 8.3–10.4)
CHLORIDE SERPL-SCNC: 106 MMOL/L (ref 101–110)
CHOLEST SERPL-MCNC: 160 MG/DL
CO2 SERPL-SCNC: 27 MMOL/L (ref 21–32)
CREAT SERPL-MCNC: 0.9 MG/DL (ref 0.6–1)
DIFFERENTIAL METHOD BLD: NORMAL
EOSINOPHIL # BLD: 0.2 K/UL (ref 0–0.8)
EOSINOPHIL NFR BLD: 3 % (ref 0.5–7.8)
ERYTHROCYTE [DISTWIDTH] IN BLOOD BY AUTOMATED COUNT: 13.1 % (ref 11.9–14.6)
EST. AVERAGE GLUCOSE BLD GHB EST-MCNC: 111 MG/DL
GLOBULIN SER CALC-MCNC: 3.6 G/DL (ref 2.8–4.5)
GLUCOSE SERPL-MCNC: 102 MG/DL (ref 65–100)
HBA1C MFR BLD: 5.5 % (ref 4.8–5.6)
HCT VFR BLD AUTO: 43.8 % (ref 35.8–46.3)
HDLC SERPL-MCNC: 41 MG/DL (ref 40–60)
HDLC SERPL: 3.9
HGB BLD-MCNC: 14.9 G/DL (ref 11.7–15.4)
IMM GRANULOCYTES # BLD AUTO: 0 K/UL (ref 0–0.5)
IMM GRANULOCYTES NFR BLD AUTO: 0 % (ref 0–5)
LDLC SERPL CALC-MCNC: 78.8 MG/DL
LYMPHOCYTES # BLD: 1.8 K/UL (ref 0.5–4.6)
LYMPHOCYTES NFR BLD: 26 % (ref 13–44)
MCH RBC QN AUTO: 32.6 PG (ref 26.1–32.9)
MCHC RBC AUTO-ENTMCNC: 34 G/DL (ref 31.4–35)
MCV RBC AUTO: 95.8 FL (ref 82–102)
MONOCYTES # BLD: 0.6 K/UL (ref 0.1–1.3)
MONOCYTES NFR BLD: 9 % (ref 4–12)
NEUTS SEG # BLD: 4.2 K/UL (ref 1.7–8.2)
NEUTS SEG NFR BLD: 61 % (ref 43–78)
NRBC # BLD: 0 K/UL (ref 0–0.2)
PLATELET # BLD AUTO: 299 K/UL (ref 150–450)
PMV BLD AUTO: 9.5 FL (ref 9.4–12.3)
POTASSIUM SERPL-SCNC: 4.7 MMOL/L (ref 3.5–5.1)
PROT SERPL-MCNC: 7.7 G/DL (ref 6.3–8.2)
RBC # BLD AUTO: 4.57 M/UL (ref 4.05–5.2)
SODIUM SERPL-SCNC: 140 MMOL/L (ref 133–143)
TRIGL SERPL-MCNC: 201 MG/DL (ref 35–150)
TSH, 3RD GENERATION: 1.07 UIU/ML (ref 0.36–3.74)
VLDLC SERPL CALC-MCNC: 40.2 MG/DL (ref 6–23)
WBC # BLD AUTO: 6.9 K/UL (ref 4.3–11.1)

## 2023-11-16 PROCEDURE — 99214 OFFICE O/P EST MOD 30 MIN: CPT | Performed by: FAMILY MEDICINE

## 2023-11-16 PROCEDURE — 1123F ACP DISCUSS/DSCN MKR DOCD: CPT | Performed by: FAMILY MEDICINE

## 2023-11-16 PROCEDURE — G0439 PPPS, SUBSEQ VISIT: HCPCS | Performed by: FAMILY MEDICINE

## 2023-11-16 PROCEDURE — 3077F SYST BP >= 140 MM HG: CPT | Performed by: FAMILY MEDICINE

## 2023-11-16 PROCEDURE — 1090F PRES/ABSN URINE INCON ASSESS: CPT | Performed by: FAMILY MEDICINE

## 2023-11-16 PROCEDURE — G8484 FLU IMMUNIZE NO ADMIN: HCPCS | Performed by: FAMILY MEDICINE

## 2023-11-16 PROCEDURE — G8427 DOCREV CUR MEDS BY ELIG CLIN: HCPCS | Performed by: FAMILY MEDICINE

## 2023-11-16 PROCEDURE — G8417 CALC BMI ABV UP PARAM F/U: HCPCS | Performed by: FAMILY MEDICINE

## 2023-11-16 PROCEDURE — 3078F DIAST BP <80 MM HG: CPT | Performed by: FAMILY MEDICINE

## 2023-11-16 PROCEDURE — G8399 PT W/DXA RESULTS DOCUMENT: HCPCS | Performed by: FAMILY MEDICINE

## 2023-11-16 PROCEDURE — 1036F TOBACCO NON-USER: CPT | Performed by: FAMILY MEDICINE

## 2023-11-16 RX ORDER — LEVOTHYROXINE SODIUM 0.05 MG/1
50 TABLET ORAL
Qty: 90 TABLET | Refills: 3 | Status: SHIPPED | OUTPATIENT
Start: 2023-11-16

## 2023-11-16 RX ORDER — GABAPENTIN 300 MG/1
300 CAPSULE ORAL NIGHTLY
Qty: 90 CAPSULE | Refills: 1 | Status: SHIPPED | OUTPATIENT
Start: 2023-11-16 | End: 2024-05-14

## 2023-11-16 RX ORDER — SENNOSIDES 8.6 MG
1300 CAPSULE ORAL 2 TIMES DAILY
COMMUNITY

## 2023-11-16 RX ORDER — ZOLPIDEM TARTRATE 5 MG/1
5 TABLET ORAL NIGHTLY PRN
Qty: 90 TABLET | Refills: 1 | Status: SHIPPED | OUTPATIENT
Start: 2024-01-22 | End: 2024-07-20

## 2023-11-16 ASSESSMENT — PATIENT HEALTH QUESTIONNAIRE - PHQ9
SUM OF ALL RESPONSES TO PHQ QUESTIONS 1-9: 1
SUM OF ALL RESPONSES TO PHQ9 QUESTIONS 1 & 2: 1
SUM OF ALL RESPONSES TO PHQ QUESTIONS 1-9: 1
SUM OF ALL RESPONSES TO PHQ QUESTIONS 1-9: 1
1. LITTLE INTEREST OR PLEASURE IN DOING THINGS: 1
2. FEELING DOWN, DEPRESSED OR HOPELESS: 0
SUM OF ALL RESPONSES TO PHQ QUESTIONS 1-9: 1

## 2023-11-16 NOTE — PATIENT INSTRUCTIONS
disclaims any warranty or liability for your use of this information. Personalized Preventive Plan for Susy Wlilams - 11/16/2023  Medicare offers a range of preventive health benefits. Some of the tests and screenings are paid in full while other may be subject to a deductible, co-insurance, and/or copay. Some of these benefits include a comprehensive review of your medical history including lifestyle, illnesses that may run in your family, and various assessments and screenings as appropriate. After reviewing your medical record and screening and assessments performed today your provider may have ordered immunizations, labs, imaging, and/or referrals for you. A list of these orders (if applicable) as well as your Preventive Care list are included within your After Visit Summary for your review. Other Preventive Recommendations:    A preventive eye exam performed by an eye specialist is recommended every 1-2 years to screen for glaucoma; cataracts, macular degeneration, and other eye disorders. A preventive dental visit is recommended every 6 months. Try to get at least 150 minutes of exercise per week or 10,000 steps per day on a pedometer . Order or download the FREE \"Exercise & Physical Activity: Your Everyday Guide\" from [employer]. Call 9-218.842.4554 or search [employer] online. You need 0070-2983 mg of calcium and 7299-6682 IU of vitamin D per day. It is possible to meet your calcium requirement with diet alone, but a vitamin D supplement is usually necessary to meet this goal.  When exposed to the sun, use a sunscreen that protects against both UVA and UVB radiation with an SPF of 30 or greater. Reapply every 2 to 3 hours or after sweating, drying off with a towel, or swimming. Always wear a seat belt when traveling in a car. Always wear a helmet when riding a bicycle or motorcycle.

## 2023-11-16 NOTE — PROGRESS NOTES
mouth daily      potassium chloride (KLOR-CON) 10 MEQ extended release tablet Take 15 mEq by mouth daily      sotalol (BETAPACE) 80 MG tablet Take 2 tablets by mouth 2 times daily       No current facility-administered medications for this visit. Return in 6 months (on 5/16/2024) for follow up. Any new medications were explained today including any potential drug interactions or significant side effects. The patient's questions in regards to this were answered today. Dictated using voice recognition software.   Proofread, but unrecognized errors may exist.

## 2023-11-17 ENCOUNTER — CLINICAL DOCUMENTATION (OUTPATIENT)
Dept: SPIRITUAL SERVICES | Age: 78
End: 2023-11-17

## 2023-11-17 NOTE — PROGRESS NOTES
Advance Care Planning   Ambulatory ACP Specialist Patient Outreach    Date:  11/17/2023    ACP Specialist:  [address] call to patient in follow-up to ACP Specialist referral from:Akin Dominguez MD    [x] PCP  [] Provider   [] Ambulatory Care Management [] Other     For:                  [x] Advance Directive Assistance              [] Complete Portable DNR order              [] Complete POST/POLST/MOST              [] Code Status Discussion             [] Discuss Goals of Care             [] Early ACP Decision-Making              [] Other (Specify)    Date Referral Received:11/16/23    Next Step:   [] ACP scheduled conversation  [] Outreach again in one week               [] Email / Mail [address]  [] Email / Mail Advance Directive   [x] Closing referral.  Routing closure to referring provider/staff and to ACP Specialist . [x] Closure letter mailed to patient with invitation to contact ACP Specialist if / when ready. [] Other (Specify here):         [x] At this time, Healthcare Decision Maker Is: Advance Care Planning   Healthcare Decision Maker:    Primary Decision Maker: Cayetano Willams [facility] - [phone]          [] Primary agent named in scanned advance directive. [x] Legal Next of Kin. [] Unable to determine legal decision maker at this time. Outreaches:         [x] 1st -  Date:  11/17/23               Intervention:  [x] Spoke with Patient   [] Left Voice mail [] Email / Mail    [] Ch[employer]  [] Other [id number] : Outcomes: Outreach phone call to the patient. Spoke with patient declining a scheduled ACP conversation. Patient hung up. At this time, we'll move to close the referral.           [] 2nd -  Date:                 Intervention:  [] Spoke with Patient  [] Left Voice mail [] Email / Mail    [] [employer]  [] Other [id number] :               Outcomes:                [] 3rd -  Date:                Intervention:  [] Spoke with Patient   []

## 2024-05-17 ENCOUNTER — OFFICE VISIT (OUTPATIENT)
Dept: FAMILY MEDICINE CLINIC | Facility: CLINIC | Age: 79
End: 2024-05-17

## 2024-05-17 VITALS
WEIGHT: 179 LBS | HEART RATE: 62 BPM | HEIGHT: 62 IN | TEMPERATURE: 98.4 F | RESPIRATION RATE: 18 BRPM | SYSTOLIC BLOOD PRESSURE: 132 MMHG | DIASTOLIC BLOOD PRESSURE: 50 MMHG | OXYGEN SATURATION: 95 % | BODY MASS INDEX: 32.94 KG/M2

## 2024-05-17 DIAGNOSIS — G47.00 INSOMNIA, UNSPECIFIED TYPE: ICD-10-CM

## 2024-05-17 DIAGNOSIS — J30.1 SEASONAL ALLERGIC RHINITIS DUE TO POLLEN: ICD-10-CM

## 2024-05-17 DIAGNOSIS — M54.50 CHRONIC LEFT-SIDED LOW BACK PAIN WITHOUT SCIATICA: ICD-10-CM

## 2024-05-17 DIAGNOSIS — I10 PRIMARY HYPERTENSION: Primary | ICD-10-CM

## 2024-05-17 DIAGNOSIS — G89.29 CHRONIC LEFT-SIDED LOW BACK PAIN WITHOUT SCIATICA: ICD-10-CM

## 2024-05-17 DIAGNOSIS — E66.09 CLASS 1 OBESITY DUE TO EXCESS CALORIES WITH SERIOUS COMORBIDITY AND BODY MASS INDEX (BMI) OF 32.0 TO 32.9 IN ADULT: ICD-10-CM

## 2024-05-17 DIAGNOSIS — M51.36 DDD (DEGENERATIVE DISC DISEASE), LUMBAR: ICD-10-CM

## 2024-05-17 PROBLEM — M51.369 DDD (DEGENERATIVE DISC DISEASE), LUMBAR: Status: ACTIVE | Noted: 2024-05-17

## 2024-05-17 RX ORDER — GABAPENTIN 100 MG/1
200 CAPSULE ORAL
Qty: 180 CAPSULE | Refills: 1 | Status: SHIPPED | OUTPATIENT
Start: 2024-05-17 | End: 2024-11-13

## 2024-05-17 RX ORDER — ZOLPIDEM TARTRATE 5 MG/1
5 TABLET ORAL NIGHTLY PRN
Qty: 90 TABLET | Refills: 1 | Status: SHIPPED | OUTPATIENT
Start: 2024-05-17 | End: 2024-11-13

## 2024-05-17 RX ORDER — GABAPENTIN 300 MG/1
300 CAPSULE ORAL NIGHTLY
Qty: 90 CAPSULE | Refills: 1 | Status: CANCELLED | OUTPATIENT
Start: 2024-05-17 | End: 2024-11-13

## 2024-05-17 SDOH — ECONOMIC STABILITY: FOOD INSECURITY: WITHIN THE PAST 12 MONTHS, THE FOOD YOU BOUGHT JUST DIDN'T LAST AND YOU DIDN'T HAVE MONEY TO GET MORE.: PATIENT DECLINED

## 2024-05-17 SDOH — ECONOMIC STABILITY: FOOD INSECURITY: WITHIN THE PAST 12 MONTHS, YOU WORRIED THAT YOUR FOOD WOULD RUN OUT BEFORE YOU GOT MONEY TO BUY MORE.: PATIENT DECLINED

## 2024-05-17 SDOH — ECONOMIC STABILITY: INCOME INSECURITY: HOW HARD IS IT FOR YOU TO PAY FOR THE VERY BASICS LIKE FOOD, HOUSING, MEDICAL CARE, AND HEATING?: PATIENT DECLINED

## 2024-05-17 ASSESSMENT — PATIENT HEALTH QUESTIONNAIRE - PHQ9
SUM OF ALL RESPONSES TO PHQ QUESTIONS 1-9: 0
1. LITTLE INTEREST OR PLEASURE IN DOING THINGS: NOT AT ALL
SUM OF ALL RESPONSES TO PHQ QUESTIONS 1-9: 0
SUM OF ALL RESPONSES TO PHQ QUESTIONS 1-9: 0
SUM OF ALL RESPONSES TO PHQ9 QUESTIONS 1 & 2: 0
2. FEELING DOWN, DEPRESSED OR HOPELESS: NOT AT ALL
SUM OF ALL RESPONSES TO PHQ QUESTIONS 1-9: 0

## 2024-05-17 NOTE — PROGRESS NOTES
[facility] [place] [place] [address]  Phone: [phone] Fax [phone]  Maurice Dominguez M.D.  5/17/2024        Jackie Willams is a 79 y.o. female     HPI:  Patient is seen for Follow-up Chronic Condition (6 month f/u non fasting), Sinus Problem (X 4 days), and Cough (X 4 days)  Patient comes in today for follow-up bringing in blood pressure record from home with systolics having been between 103 and 138 and diastolics between 61 and 75.  Previous visit patient had significantly elevated blood pressure.  She was told to monitor pressures in the morning and evening.     She describes having had a great grandson visit her earlier this week with him having some asthma complications.  Since Wednesday she has had some mild chest congestion and even a little bit of a wheeze when lying down as well as a persistent cough with some sneezing and postnasal drip that has been yellow in color.  Has not noticed any fever or bodyaches.  Delsym cough syrup has helped with her cough.    Patient states that she tried to stop the gabapentin but found that she did not have as much relief of her lower back pain when she stopped it.  She takes it only at night but states right after taking it she may notice some lightheadedness if she ambulates.  Had not noticed that prior with her Ambien which continues to help with maintenance of sleep although she prefers a higher dose than the current 5 mg.  Does get about 8 to 8 1/2 hours of sleep nightly.       ROS:  Denies any chest pain dyspnea diaphoresis or edema.  No reported palpitations or tachycardia. No polydipsia or polyphagia or polyuria.  No large weight fluctuations.  No nausea or vomiting.  No abdominal pain. No recent change in voiding or stooling. Denies any depression.     No Known Allergies    Active Ambulatory Problems     Diagnosis Date Noted    Insomnia 09/25/2020    Menopause 11/07/2012    Acquired hypothyroidism

## 2024-09-26 NOTE — TELEPHONE ENCOUNTER
Chief complaint:    Chief Complaint   Patient presents with    Follow-up    Thyroid Problem     Pt is coming in today for results from a CT for thyroid nodules         Referring Provider:  No referring provider defined for this encounter.    History of present illness:     Ms. Amato is a 47 y.o.  presenting for evaluation of thyroid nodule/s.     She states that she has not noted a mass in her neck.       Denies pain.       She denies sore throat, dysphagia, otalgia, voice change, hemoptysis.      No family/personal history of thyroid cancer.     No personal history of head and neck cancer/head and neck radiation.    No personal history of hypo- or hyperthyroidism.    She is gaining weight.     History      Past Medical History:   Past Medical History:   Diagnosis Date    Carpal tunnel syndrome     bilateral    Hypertension     Iron deficiency anemia     Mild intermittent asthma     .          Past Surgical History:  Past Surgical History:   Procedure Laterality Date    COLONOSCOPY N/A 4/28/2023    Procedure: COLONOSCOPY;  Surgeon: Maximo Cox MD;  Location: [facility];  Service: Endoscopy;  Laterality: N/A;    EYE FOREIGN BODY REMOVAL Right     EYE SURGERY  2009    Im not sure what year    HYSTERECTOMY  2020    OOPHORECTOMY      ROBOT-ASSISTED LAPAROSCOPIC ABDOMINAL HYSTERECTOMY USING DA ALYSA XI N/A 12/06/2019    Procedure: XI ROBOTIC HYSTERECTOMY;  Surgeon: VENESSA Davidson MD;  Location: [facility] OR;  Service: OB/GYN;  Laterality: N/A;    ROBOT-ASSISTED SURGICAL REMOVAL OF FALLOPIAN TUBE USING DA ALYSA XI Bilateral 12/06/2019    Procedure: XI ROBOTIC SALPINGECTOMY;  Surgeon: VENESSA Davidson MD;  Location: [facility] OR;  Service: OB/GYN;  Laterality: Bilateral;    SURGICAL REMOVAL OF MASS OF AXILLA Right 11/12/2018    Procedure: EXCISION, MASS, AXILLARY;  Surgeon: Alis Tian MD;  Location: [facility] OR;  Service: General;  Laterality: Right;    TUBAL LIGATION              Medications:  She  has a  Form from Adiel carver PT placed in MD folder to sign current medication list which includes the following prescription(s): albuterol, clotrimazole, hydrochlorothiazide, ketoconazole, losartan, nebulizer and compressor, and amlodipine.      Allergies:   Review of patient's allergies indicates:   Allergen Reactions    Latex Rash and Shortness Of Breath    Latex, natural rubber Hives    Penicillins Hives, Rash and Shortness Of Breath    Sulfa (sulfonamide antibiotics) Hives, Swelling, Rash and Shortness Of Breath         Family history: family history includes Asthma in her father; COPD in her father; Diabetes in her mother; Hypertension in her father and mother; Sarcoidosis in her mother.             Social History          Alcohol use:  reports no history of alcohol use.            Tobacco:  reports that she has never smoked. She has never used smokeless tobacco.           Physical Examination     Wt 89.2 kg (196 lb 10.4 oz)   LMP 11/07/2019 (Approximate)   BMI 32.72 kg/m²      The head and neck examination included the following elements which were found to be within normal limits unless otherwise noted below: general appearance, mood and affect, quality of voice, inspection of head and face, cranial nerve examination, external ears, ear canals, and tympanic membranes, external nose, nasal mucosa, septum and turbinates, lips, oral cavity,oropharynx, larynx, pharyngeal walls and pyriform sinuses, cervical lymphatics,  neck and thyroid bed, pupils and extraocular motion, carotid pulses bilaterally, temporomandibular joint, respiratory assessment for effort, stridor, lung symmetry, and retractions, rashes or lesions on head or neck, and facial nerve function.     Salient findings:        normal voice     no palpable thyroid nodule     no enlarged cervical adenopathy      Data reviewed      Pathology      Right inferior - FLUS  Left - benign    Laboratory    Lab Results   Component Value Date    TSH 0.343 (L) 07/03/2024        Lab Results   Component Value Date     CALCIUM 9.4 07/03/2024        Imaging    I have independently reviewed the following imaging with the findings noted below:      US thyroid    Righrt inferior nodule - hypoechoic, irregular, possible ETE, calcification    Impression/Plan     No diagnosis found.     FNA consistent with FLUS for the right and benign on the left.    I would first recommend NM thyroid scan. If nodules are hot, would plan for treatment first, then consider re-biopsy.    If NM does not reveal that the FLUS nodule on the right is hot nodule, then recommended treatment of the atypical left thyroid nodule would be lobectomy due to the high risk features on the US. She would not be a candidate for genetic testing.     We will f/u after NM thyroid scan. Can be virtual       Eduard Quintero MD  [facility]   [facility] - [address]  [place] [id number]  P: [phone]  F: [phone]

## 2024-11-25 ENCOUNTER — CLINICAL DOCUMENTATION (OUTPATIENT)
Dept: FAMILY MEDICINE CLINIC | Facility: CLINIC | Age: 79
End: 2024-11-25

## 2024-11-25 ENCOUNTER — OFFICE VISIT (OUTPATIENT)
Dept: FAMILY MEDICINE CLINIC | Facility: CLINIC | Age: 79
End: 2024-11-25

## 2024-11-25 VITALS
DIASTOLIC BLOOD PRESSURE: 62 MMHG | SYSTOLIC BLOOD PRESSURE: 148 MMHG | OXYGEN SATURATION: 97 % | HEART RATE: 55 BPM | TEMPERATURE: 98.7 F | WEIGHT: 172 LBS | HEIGHT: 62 IN | BODY MASS INDEX: 31.65 KG/M2 | RESPIRATION RATE: 18 BRPM

## 2024-11-25 DIAGNOSIS — G47.00 INSOMNIA, UNSPECIFIED TYPE: ICD-10-CM

## 2024-11-25 DIAGNOSIS — M54.50 CHRONIC LEFT-SIDED LOW BACK PAIN WITHOUT SCIATICA: ICD-10-CM

## 2024-11-25 DIAGNOSIS — Z78.0 POST-MENOPAUSAL: ICD-10-CM

## 2024-11-25 DIAGNOSIS — M25.562 ACUTE PAIN OF LEFT KNEE: ICD-10-CM

## 2024-11-25 DIAGNOSIS — Z12.31 VISIT FOR SCREENING MAMMOGRAM: ICD-10-CM

## 2024-11-25 DIAGNOSIS — Z23 NEED FOR PROPHYLACTIC VACCINATION AGAINST DIPHTHERIA-TETANUS-PERTUSSIS (DTP): ICD-10-CM

## 2024-11-25 DIAGNOSIS — I10 PRIMARY HYPERTENSION: ICD-10-CM

## 2024-11-25 DIAGNOSIS — Z23 NEED FOR PROPHYLACTIC VACCINATION AND INOCULATION AGAINST VARICELLA: ICD-10-CM

## 2024-11-25 DIAGNOSIS — G89.29 CHRONIC LEFT-SIDED LOW BACK PAIN WITHOUT SCIATICA: ICD-10-CM

## 2024-11-25 DIAGNOSIS — Z29.11 NEED FOR RSV VACCINATION: ICD-10-CM

## 2024-11-25 DIAGNOSIS — M51.360 DEGENERATION OF INTERVERTEBRAL DISC OF LUMBAR REGION WITH DISCOGENIC BACK PAIN: ICD-10-CM

## 2024-11-25 DIAGNOSIS — E03.9 ACQUIRED HYPOTHYROIDISM: ICD-10-CM

## 2024-11-25 DIAGNOSIS — E66.811 CLASS 1 OBESITY DUE TO EXCESS CALORIES WITH SERIOUS COMORBIDITY AND BODY MASS INDEX (BMI) OF 31.0 TO 31.9 IN ADULT: ICD-10-CM

## 2024-11-25 DIAGNOSIS — Z23 NEED FOR PROPHYLACTIC VACCINATION AGAINST STREPTOCOCCUS PNEUMONIAE (PNEUMOCOCCUS): ICD-10-CM

## 2024-11-25 DIAGNOSIS — R73.09 ELEVATED GLUCOSE: ICD-10-CM

## 2024-11-25 DIAGNOSIS — Z00.00 MEDICARE ANNUAL WELLNESS VISIT, SUBSEQUENT: Primary | ICD-10-CM

## 2024-11-25 DIAGNOSIS — E66.09 CLASS 1 OBESITY DUE TO EXCESS CALORIES WITH SERIOUS COMORBIDITY AND BODY MASS INDEX (BMI) OF 31.0 TO 31.9 IN ADULT: ICD-10-CM

## 2024-11-25 DIAGNOSIS — R35.0 URINARY FREQUENCY: ICD-10-CM

## 2024-11-25 DIAGNOSIS — E78.2 MIXED HYPERLIPIDEMIA: ICD-10-CM

## 2024-11-25 PROBLEM — M25.552 CHRONIC LEFT HIP PAIN: Status: RESOLVED | Noted: 2023-03-16 | Resolved: 2024-11-25

## 2024-11-25 PROBLEM — E87.5 HYPERKALEMIA: Status: RESOLVED | Noted: 2023-11-16 | Resolved: 2024-11-25

## 2024-11-25 LAB
BILIRUBIN, URINE, POC: NEGATIVE
BLOOD URINE, POC: NORMAL
GLUCOSE URINE, POC: NEGATIVE
KETONES, URINE, POC: NORMAL
LEUKOCYTE ESTERASE, URINE, POC: NORMAL
NITRITE, URINE, POC: NEGATIVE
PH, URINE, POC: 5 (ref 4.6–8)
PROTEIN,URINE, POC: NORMAL
SPECIFIC GRAVITY, URINE, POC: 1.01 (ref 1–1.03)
URINALYSIS CLARITY, POC: NORMAL
URINALYSIS COLOR, POC: NORMAL
UROBILINOGEN, POC: NORMAL

## 2024-11-25 RX ORDER — RESPIRATORY SYNCYTIAL VISUS VACCINE RECOMBINANT, ADJUVANTED 120MCG/0.5
0.5 KIT INTRAMUSCULAR ONCE
Qty: 0.5 ML | Refills: 0 | Status: SHIPPED | OUTPATIENT
Start: 2024-11-25 | End: 2024-11-25

## 2024-11-25 RX ORDER — MELOXICAM 7.5 MG/1
7.5 TABLET ORAL DAILY
Qty: 30 TABLET | Refills: 2 | Status: SHIPPED | OUTPATIENT
Start: 2024-11-25

## 2024-11-25 RX ORDER — ZOLPIDEM TARTRATE 5 MG/1
5 TABLET ORAL NIGHTLY PRN
Qty: 90 TABLET | Refills: 1 | Status: SHIPPED | OUTPATIENT
Start: 2025-01-23 | End: 2025-07-22

## 2024-11-25 RX ORDER — LEVOTHYROXINE SODIUM 50 UG/1
50 TABLET ORAL
Qty: 90 TABLET | Refills: 3 | Status: SHIPPED | OUTPATIENT
Start: 2024-11-25

## 2024-11-25 RX ORDER — CEFDINIR 300 MG/1
300 CAPSULE ORAL 2 TIMES DAILY
COMMUNITY
Start: 2024-11-16

## 2024-11-25 RX ORDER — GABAPENTIN 100 MG/1
200 CAPSULE ORAL
Qty: 180 CAPSULE | Refills: 3 | Status: SHIPPED | OUTPATIENT
Start: 2024-11-25 | End: 2025-11-20

## 2024-11-25 ASSESSMENT — PATIENT HEALTH QUESTIONNAIRE - PHQ9
SUM OF ALL RESPONSES TO PHQ QUESTIONS 1-9: 0
1. LITTLE INTEREST OR PLEASURE IN DOING THINGS: NOT AT ALL
SUM OF ALL RESPONSES TO PHQ9 QUESTIONS 1 & 2: 0
SUM OF ALL RESPONSES TO PHQ QUESTIONS 1-9: 0
2. FEELING DOWN, DEPRESSED OR HOPELESS: NOT AT ALL
SUM OF ALL RESPONSES TO PHQ QUESTIONS 1-9: 0
SUM OF ALL RESPONSES TO PHQ QUESTIONS 1-9: 0

## 2024-11-25 ASSESSMENT — LIFESTYLE VARIABLES
HOW MANY STANDARD DRINKS CONTAINING ALCOHOL DO YOU HAVE ON A TYPICAL DAY: PATIENT DOES NOT DRINK
HOW OFTEN DO YOU HAVE A DRINK CONTAINING ALCOHOL: NEVER

## 2024-11-25 NOTE — PATIENT INSTRUCTIONS
Shortness of breath.     Pain, pressure, or a strange feeling in the back, neck, jaw, or upper belly or in one or both shoulders or arms.     Lightheadedness or sudden weakness.     A fast or irregular heartbeat.   After you call 911, the  may tell you to chew 1 adult-strength or 2 to 4 low-dose aspirin. Wait for an ambulance. Do not try to drive yourself.  Watch closely for changes in your health, and be sure to contact your doctor if you have any problems.  Where can you learn more?  Go to https://www.[employer].net/patientEd and enter F075 to learn more about \"A Healthy Heart: Care Instructions.\"  Current as of: June 24, 2023  Content Version: 14.2  © 2024 [employer].   Care instructions adapted under license by [employer]. If you have questions about a medical condition or this instruction, always ask your healthcare professional. [employer] disclaims any warranty or liability for your use of this information.      Personalized Preventive Plan for Jackie Willams - 11/25/2024  Medicare offers a range of preventive health benefits. Some of the tests and screenings are paid in full while other may be subject to a deductible, co-insurance, and/or copay.    Some of these benefits include a comprehensive review of your medical history including lifestyle, illnesses that may run in your family, and various assessments and screenings as appropriate.    After reviewing your medical record and screening and assessments performed today your provider may have ordered immunizations, labs, imaging, and/or referrals for you.  A list of these orders (if applicable) as well as your Preventive Care list are included within your After Visit Summary for your review.    Other Preventive Recommendations:    A preventive eye exam performed by an eye specialist is recommended every 1-2 years to screen for glaucoma; cataracts, macular degeneration, and other eye disorders.  A preventive dental

## 2024-11-25 NOTE — PROGRESS NOTES
Faxed order for Bone Density test to [facility] scheduling for patient to be scheduled @ [facility]. Confirmation received.

## 2024-11-25 NOTE — PROGRESS NOTES
500 mg, 2 capsules twice daily without significant benefit recently although did notice some help initially.    She does report having seen someone in the recent past for UTI was treated with cefdinir 300 mg daily which she continues to take but still having fairly frequent urination with some mild dysuria and is concerned this has not been helpful.  Would like this reassessed.  No fever chills or emesis.    Not fasting today as she did eat some grits this morning.  She has had prior notation for mild elevation of glucose.  Has had history of hypothyroidism.  Does take her Ambien 5 mg nightly and able to sleep at least 5 hours each night secondary to this.  Continues her gabapentin nightly with good benefit.  No significant sedation the following morning.       ROS:  Denies any chest pain dyspnea diaphoresis or current edema.  No reported palpitations or tachycardia. No polydipsia or polyphagia.  No large weight fluctuations. No dyspepsia or reflux.  No abdominal pain. No hematochezia melena diarrhea or constipation. No hematuria. Denies any depression.     Allergies   Allergen Reactions    Macrobid [Nitrofurantoin] Rash       Active Ambulatory Problems     Diagnosis Date Noted    Insomnia 09/25/2020    Menopause 11/07/2012    Acquired hypothyroidism 11/03/2014    Primary hypertension 11/07/2012    HLD (hyperlipidemia) 11/07/2012    Asthma 11/07/2012    Primary osteoarthritis of both knees 09/25/2020    Chronic left-sided low back pain without sciatica 11/10/2022    Class 1 obesity due to excess calories with serious comorbidity and body mass index (BMI) of 32.0 to 32.9 in adult 11/16/2023    Elevated glucose 11/16/2023    Seasonal allergic rhinitis due to pollen 05/17/2024    DDD (degenerative disc disease), lumbar 05/17/2024     Resolved Ambulatory Problems     Diagnosis Date Noted    Arthritis of right knee 11/10/2020    Chronic atrial fibrillation (HCC) 12/12/2018    Chronic left hip pain 03/16/2023

## 2024-11-26 ENCOUNTER — CLINICAL DOCUMENTATION (OUTPATIENT)
Dept: SPIRITUAL SERVICES | Age: 79
End: 2024-11-26

## 2024-11-26 NOTE — ACP (ADVANCE CARE PLANNING)
Advance Care Planning   Ambulatory ACP Specialist Patient Outreach    Date:  11/26/2024    ACP Specialist:  Roxanna Nunes    Outreach call to patient in follow-up to ACP Specialist referral from:Maurice Dominguez MD    [x] PCP  [] Provider   [] Ambulatory Care Management [] Other     For:                  [x] Advance Directive Assistance              [] Complete Portable DNR order              [] Complete POST/POLST/MOST              [] Code Status Discussion             [] Discuss Goals of Care             [] Early ACP Decision-Making              [] Other (Specify)    Date Referral Received: 11/25/2024    Next Step:   [] ACP scheduled conversation  [] Outreach again in one week               [] Email / Mail ACP Info Sheets  [] Email / Mail Advance Directive   [x] Closing referral.  Routing closure to referring provider/staff and to ACP Specialist .    [] Closure letter mailed to patient with invitation to contact ACP Specialist if / when ready.   [] Other (Specify here):       [x] At this time, Healthcare Decision Maker Is:         Primary Decision Maker: Cayetano Willams - Spouse - [phone]      [] Primary agent named in scanned advance directive.    [x] Legal Next of Kin.     [] Unable to determine legal decision maker at this time.    Outreaches:       [x] 1st -  Date:  11/26/2024               Intervention:  [x] Spoke with Patient   [] Left Voice mail [] Email / Mail    [] Station [facility]  [] Other (Specify) :     Outcomes:  Writer attempted ACP outreach to patient's home number ([phone]) - spoke to patient.  Patient declined ACP Specialist assistance stating \"I have a will at home and don't care about this stuff.\"  Call was ended without further communication from patient.  Referral will be closed.        Thank you for this referral.

## 2024-11-27 RX ORDER — CEPHALEXIN 500 MG/1
500 CAPSULE ORAL 2 TIMES DAILY
Qty: 14 CAPSULE | Refills: 0 | Status: SHIPPED | OUTPATIENT
Start: 2024-11-27

## 2024-11-28 LAB
BACTERIA SPEC CULT: ABNORMAL
SERVICE CMNT-IMP: ABNORMAL

## 2025-03-05 ENCOUNTER — TELEPHONE (OUTPATIENT)
Dept: FAMILY MEDICINE CLINIC | Facility: CLINIC | Age: 80
End: 2025-03-05

## 2025-03-05 NOTE — TELEPHONE ENCOUNTER
Medical records of [facility] received by fax from [facility]. Placed in clinical staff tray in front office for Dr Dominguez.

## 2025-05-29 ENCOUNTER — RESULTS FOLLOW-UP (OUTPATIENT)
Dept: FAMILY MEDICINE CLINIC | Facility: CLINIC | Age: 80
End: 2025-05-29

## 2025-05-29 ENCOUNTER — OFFICE VISIT (OUTPATIENT)
Dept: FAMILY MEDICINE CLINIC | Facility: CLINIC | Age: 80
End: 2025-05-29
Payer: MEDICARE

## 2025-05-29 VITALS
SYSTOLIC BLOOD PRESSURE: 146 MMHG | RESPIRATION RATE: 16 BRPM | WEIGHT: 170 LBS | HEIGHT: 62 IN | TEMPERATURE: 98.8 F | DIASTOLIC BLOOD PRESSURE: 56 MMHG | OXYGEN SATURATION: 97 % | HEART RATE: 68 BPM | BODY MASS INDEX: 31.28 KG/M2

## 2025-05-29 DIAGNOSIS — M17.12 PRIMARY OSTEOARTHRITIS OF LEFT KNEE: ICD-10-CM

## 2025-05-29 DIAGNOSIS — M25.562 CHRONIC PAIN OF LEFT KNEE: ICD-10-CM

## 2025-05-29 DIAGNOSIS — N39.0 URINARY TRACT INFECTION WITH HEMATURIA, SITE UNSPECIFIED: ICD-10-CM

## 2025-05-29 DIAGNOSIS — M54.50 CHRONIC LEFT-SIDED LOW BACK PAIN WITHOUT SCIATICA: ICD-10-CM

## 2025-05-29 DIAGNOSIS — N39.0 URINARY TRACT INFECTION WITH HEMATURIA, SITE UNSPECIFIED: Primary | ICD-10-CM

## 2025-05-29 DIAGNOSIS — E66.811 CLASS 1 OBESITY DUE TO EXCESS CALORIES WITH SERIOUS COMORBIDITY AND BODY MASS INDEX (BMI) OF 31.0 TO 31.9 IN ADULT: ICD-10-CM

## 2025-05-29 DIAGNOSIS — G89.29 CHRONIC LEFT-SIDED LOW BACK PAIN WITHOUT SCIATICA: ICD-10-CM

## 2025-05-29 DIAGNOSIS — E03.9 ACQUIRED HYPOTHYROIDISM: ICD-10-CM

## 2025-05-29 DIAGNOSIS — M51.360 DEGENERATION OF INTERVERTEBRAL DISC OF LUMBAR REGION WITH DISCOGENIC BACK PAIN: ICD-10-CM

## 2025-05-29 DIAGNOSIS — I10 PRIMARY HYPERTENSION: ICD-10-CM

## 2025-05-29 DIAGNOSIS — R31.9 URINARY TRACT INFECTION WITH HEMATURIA, SITE UNSPECIFIED: ICD-10-CM

## 2025-05-29 DIAGNOSIS — R31.9 URINARY TRACT INFECTION WITH HEMATURIA, SITE UNSPECIFIED: Primary | ICD-10-CM

## 2025-05-29 DIAGNOSIS — G47.00 INSOMNIA, UNSPECIFIED TYPE: ICD-10-CM

## 2025-05-29 DIAGNOSIS — R30.0 DYSURIA: ICD-10-CM

## 2025-05-29 DIAGNOSIS — G89.29 CHRONIC PAIN OF LEFT KNEE: ICD-10-CM

## 2025-05-29 DIAGNOSIS — E66.09 CLASS 1 OBESITY DUE TO EXCESS CALORIES WITH SERIOUS COMORBIDITY AND BODY MASS INDEX (BMI) OF 31.0 TO 31.9 IN ADULT: ICD-10-CM

## 2025-05-29 LAB
ANION GAP SERPL CALC-SCNC: 11 MMOL/L (ref 7–16)
BILIRUBIN, URINE, POC: NEGATIVE
BLOOD URINE, POC: NORMAL
BUN SERPL-MCNC: 10 MG/DL (ref 8–23)
CALCIUM SERPL-MCNC: 9.4 MG/DL (ref 8.8–10.2)
CHLORIDE SERPL-SCNC: 103 MMOL/L (ref 98–107)
CO2 SERPL-SCNC: 28 MMOL/L (ref 20–29)
CREAT SERPL-MCNC: 0.76 MG/DL (ref 0.6–1.1)
GLUCOSE SERPL-MCNC: 94 MG/DL (ref 70–99)
GLUCOSE URINE, POC: NEGATIVE
KETONES, URINE, POC: NEGATIVE
LEUKOCYTE ESTERASE, URINE, POC: NORMAL
NITRITE, URINE, POC: NEGATIVE
PH, URINE, POC: 7.5 (ref 4.6–8)
POTASSIUM SERPL-SCNC: 4.2 MMOL/L (ref 3.5–5.1)
PROTEIN,URINE, POC: NORMAL
SODIUM SERPL-SCNC: 141 MMOL/L (ref 136–145)
SPECIFIC GRAVITY, URINE, POC: 1.01 (ref 1–1.03)
TSH W FREE THYROID IF ABNORMAL: 1.08 UIU/ML (ref 0.27–4.2)
URINALYSIS CLARITY, POC: NORMAL
URINALYSIS COLOR, POC: YELLOW
UROBILINOGEN, POC: NORMAL

## 2025-05-29 PROCEDURE — 99214 OFFICE O/P EST MOD 30 MIN: CPT | Performed by: FAMILY MEDICINE

## 2025-05-29 PROCEDURE — 1160F RVW MEDS BY RX/DR IN RCRD: CPT | Performed by: FAMILY MEDICINE

## 2025-05-29 PROCEDURE — 1159F MED LIST DOCD IN RCRD: CPT | Performed by: FAMILY MEDICINE

## 2025-05-29 PROCEDURE — 3077F SYST BP >= 140 MM HG: CPT | Performed by: FAMILY MEDICINE

## 2025-05-29 PROCEDURE — 81003 URINALYSIS AUTO W/O SCOPE: CPT | Performed by: FAMILY MEDICINE

## 2025-05-29 PROCEDURE — 1123F ACP DISCUSS/DSCN MKR DOCD: CPT | Performed by: FAMILY MEDICINE

## 2025-05-29 PROCEDURE — G8399 PT W/DXA RESULTS DOCUMENT: HCPCS | Performed by: FAMILY MEDICINE

## 2025-05-29 PROCEDURE — 1125F AMNT PAIN NOTED PAIN PRSNT: CPT | Performed by: FAMILY MEDICINE

## 2025-05-29 PROCEDURE — 1036F TOBACCO NON-USER: CPT | Performed by: FAMILY MEDICINE

## 2025-05-29 PROCEDURE — 1090F PRES/ABSN URINE INCON ASSESS: CPT | Performed by: FAMILY MEDICINE

## 2025-05-29 PROCEDURE — G8417 CALC BMI ABV UP PARAM F/U: HCPCS | Performed by: FAMILY MEDICINE

## 2025-05-29 PROCEDURE — G8427 DOCREV CUR MEDS BY ELIG CLIN: HCPCS | Performed by: FAMILY MEDICINE

## 2025-05-29 PROCEDURE — 3078F DIAST BP <80 MM HG: CPT | Performed by: FAMILY MEDICINE

## 2025-05-29 RX ORDER — LEVOTHYROXINE SODIUM 50 UG/1
50 TABLET ORAL
Qty: 90 TABLET | Refills: 2 | Status: SHIPPED | OUTPATIENT
Start: 2025-05-29

## 2025-05-29 RX ORDER — MELOXICAM 7.5 MG/1
7.5 TABLET ORAL DAILY
Qty: 90 TABLET | Refills: 1 | Status: SHIPPED | OUTPATIENT
Start: 2025-05-29

## 2025-05-29 RX ORDER — ZOLPIDEM TARTRATE 5 MG/1
5 TABLET ORAL NIGHTLY PRN
Qty: 90 TABLET | Refills: 1 | Status: SHIPPED | OUTPATIENT
Start: 2025-07-20 | End: 2026-01-16

## 2025-05-29 RX ORDER — SULFAMETHOXAZOLE AND TRIMETHOPRIM 800; 160 MG/1; MG/1
1 TABLET ORAL 2 TIMES DAILY
Qty: 14 TABLET | Refills: 0 | Status: SHIPPED | OUTPATIENT
Start: 2025-05-29 | End: 2025-06-05

## 2025-05-29 RX ORDER — GABAPENTIN 100 MG/1
100 CAPSULE ORAL
Qty: 90 CAPSULE | Refills: 1 | Status: SHIPPED | OUTPATIENT
Start: 2025-05-29 | End: 2025-11-25

## 2025-05-29 SDOH — ECONOMIC STABILITY: FOOD INSECURITY: WITHIN THE PAST 12 MONTHS, YOU WORRIED THAT YOUR FOOD WOULD RUN OUT BEFORE YOU GOT MONEY TO BUY MORE.: PATIENT DECLINED

## 2025-05-29 SDOH — ECONOMIC STABILITY: FOOD INSECURITY: WITHIN THE PAST 12 MONTHS, THE FOOD YOU BOUGHT JUST DIDN'T LAST AND YOU DIDN'T HAVE MONEY TO GET MORE.: PATIENT DECLINED

## 2025-05-29 ASSESSMENT — PATIENT HEALTH QUESTIONNAIRE - PHQ9
2. FEELING DOWN, DEPRESSED OR HOPELESS: NOT AT ALL
SUM OF ALL RESPONSES TO PHQ QUESTIONS 1-9: 0
SUM OF ALL RESPONSES TO PHQ QUESTIONS 1-9: 0
1. LITTLE INTEREST OR PLEASURE IN DOING THINGS: NOT AT ALL
SUM OF ALL RESPONSES TO PHQ QUESTIONS 1-9: 0
1. LITTLE INTEREST OR PLEASURE IN DOING THINGS: NOT AT ALL
SUM OF ALL RESPONSES TO PHQ QUESTIONS 1-9: 0
SUM OF ALL RESPONSES TO PHQ QUESTIONS 1-9: 0

## 2025-05-29 NOTE — PROGRESS NOTES
[facility] [place] [address]  Phone: [phone] Fax [phone]  Maurice Dominguez M.D.  5/29/2025        Jackie Willams is a 80 y.o. female     HPI:  Patient is seen for Hypertension     Patient comes in today for follow-up.  States that blood pressure has been fairly well-controlled but she has not checked it recently.  States she takes her Lasix about every other day and only 1/2 tablet as this results in excessive polyuria.  Takes her potassium every other day as well.    Describes continued burning discomfort with urination.  Brings in information where she has recently taken Macrobid and then cefdinir and then cephalexin without noted benefit.  There is what she can take to help with the burning.  No fever chills nausea or vomiting.  No noted hematuria.    Continues to have recurrent left knee pain.  States that the meloxicam provided previously was greatly beneficial and she would like to continue this.  Allows her to participate in her ADLs.  No radicular pain.  Has known history of bilateral knee arthritis with right knee replaced and most recent x-ray of left knee showing some moderate degenerative changes.  Continues to hurt along the inferomedial aspect of the left knee.  No recent falls.  No locking sensation or giving way.    Patient takes gabapentin 100 mg 2 tablets nightly.  This does result in some sedation the following morning and she would like to decrease to 1 tablet as she does not have significant back pain at this time.  She does continue her Ambien nightly with this allowing her fair sleep and having tolerated this well consistently in the past.    ROS:  Denies any chest pain dyspnea diaphoresis or persistent edema.  No reported palpitations or tachycardia. No polydipsia or polyphagia.  No large weight fluctuations. No recurrent dyspepsia or reflux.  No abdominal pain. No recent change in bowel movements. No hematuria. Denies any

## 2025-06-01 LAB
BACTERIA SPEC CULT: ABNORMAL
BACTERIA SPEC CULT: ABNORMAL
SERVICE CMNT-IMP: ABNORMAL

## 2025-08-20 ENCOUNTER — OFFICE VISIT (OUTPATIENT)
Dept: FAMILY MEDICINE CLINIC | Facility: CLINIC | Age: 80
End: 2025-08-20

## 2025-08-20 VITALS
BODY MASS INDEX: 29.92 KG/M2 | RESPIRATION RATE: 19 BRPM | HEIGHT: 62 IN | HEART RATE: 60 BPM | OXYGEN SATURATION: 96 % | TEMPERATURE: 98.3 F | DIASTOLIC BLOOD PRESSURE: 58 MMHG | WEIGHT: 162.6 LBS | SYSTOLIC BLOOD PRESSURE: 143 MMHG

## 2025-08-20 DIAGNOSIS — R93.3 ABNORMAL COMPUTED TOMOGRAPHY OF CECUM AND TERMINAL ILEUM: ICD-10-CM

## 2025-08-20 DIAGNOSIS — M25.562 CHRONIC PAIN OF LEFT KNEE: ICD-10-CM

## 2025-08-20 DIAGNOSIS — R91.1 PULMONARY NODULE: ICD-10-CM

## 2025-08-20 DIAGNOSIS — N30.00 ACUTE CYSTITIS WITHOUT HEMATURIA: ICD-10-CM

## 2025-08-20 DIAGNOSIS — N28.9 KIDNEY LESION, NATIVE, RIGHT: ICD-10-CM

## 2025-08-20 DIAGNOSIS — G89.29 CHRONIC PAIN OF LEFT KNEE: ICD-10-CM

## 2025-08-20 DIAGNOSIS — M51.360 DEGENERATION OF INTERVERTEBRAL DISC OF LUMBAR REGION WITH DISCOGENIC BACK PAIN: Primary | ICD-10-CM

## 2025-08-20 ASSESSMENT — ENCOUNTER SYMPTOMS
SHORTNESS OF BREATH: 0
BACK PAIN: 1

## 2025-08-27 ENCOUNTER — TELEPHONE (OUTPATIENT)
Dept: PULMONOLOGY | Age: 80
End: 2025-08-27

## 2025-08-27 ENCOUNTER — HOSPITAL ENCOUNTER (OUTPATIENT)
Dept: CT IMAGING | Age: 80
Discharge: HOME | End: 2025-08-29
Payer: MEDICARE

## 2025-08-27 DIAGNOSIS — R93.3 ABNORMAL COMPUTED TOMOGRAPHY OF CECUM AND TERMINAL ILEUM: ICD-10-CM

## 2025-08-27 DIAGNOSIS — R91.1 PULMONARY NODULE: Primary | ICD-10-CM

## 2025-08-27 DIAGNOSIS — N28.9 KIDNEY LESION, NATIVE, RIGHT: ICD-10-CM

## 2025-08-27 LAB — CREAT BLD-MCNC: 0.55 MG/DL (ref 0.8–1.5)

## 2025-08-27 PROCEDURE — 74178 CT ABD&PLV WO CNTR FLWD CNTR: CPT

## 2025-08-27 PROCEDURE — 6360000004 HC RX CONTRAST MEDICATION: Performed by: PHYSICIAN ASSISTANT

## 2025-08-27 PROCEDURE — 82565 ASSAY OF CREATININE: CPT

## 2025-08-27 RX ORDER — IOPAMIDOL 755 MG/ML
100 INJECTION, SOLUTION INTRAVASCULAR
Status: COMPLETED | OUTPATIENT
Start: 2025-08-27 | End: 2025-08-27

## 2025-08-27 RX ADMIN — IOPAMIDOL 100 ML: 755 INJECTION, SOLUTION INTRAVENOUS at 15:25

## (undated) DEVICE — NEEDLE HYPO 18GA L1.5IN PNK S STL HUB POLYPR SHLD REG BVL

## (undated) DEVICE — PREP SKN CHLRAPRP APL 26ML STR --

## (undated) DEVICE — STERILE PRESSURE PROTECTOR PAD® FOR DE MAYO UNIVERSAL DISTRACTOR® (10/CASE): Brand: DE MAYO UNIVERSAL DISTRACTOR®

## (undated) DEVICE — YANKAUER,BULB TIP,W/O VENT,RIGID,STERILE: Brand: MEDLINE

## (undated) DEVICE — SUTURE MCRYL SZ 3-0 L27IN ABSRB UD L24MM PS-1 3/8 CIR PRIM Y936H

## (undated) DEVICE — T4 HOOD

## (undated) DEVICE — SOLUTION IV DEXTROSE/SALINE 5%-0.9% 500ML - 500ML

## (undated) DEVICE — Device

## (undated) DEVICE — PACK PROCEDURE SURG TOT KNEE

## (undated) DEVICE — SUTURE ETHBND EXCEL SZ 2 L30IN NONABSORBABLE GRN L40MM V-37 MX69G

## (undated) DEVICE — SYR 50ML LR LCK 1ML GRAD NSAF --

## (undated) DEVICE — SOLUTION IV 1000ML 0.9% SOD CHL

## (undated) DEVICE — BIPOLAR SEALER 23-112-1 AQM 6.0: Brand: AQUAMANTYS ®

## (undated) DEVICE — SYR LR LCK 1ML GRAD NSAF 30ML --

## (undated) DEVICE — ZIMMER® STERILE DISPOSABLE TOURNIQUET CUFF WITH PLC, DUAL PORT, SINGLE BLADDER, 30 IN. (76 CM)

## (undated) DEVICE — Device: Brand: STABLECUT®

## (undated) DEVICE — TRAY PREP DRY W/ PREM GLV 2 APPL 6 SPNG 2 UNDPD 1 OVERWRAP

## (undated) DEVICE — BUTTON SWITCH PENCIL BLADE ELECTRODE, HOLSTER: Brand: EDGE

## (undated) DEVICE — HANDPIECE SET WITH COAXIAL HIGH FLOW TIP AND SUCTION TUBE: Brand: INTERPULSE

## (undated) DEVICE — CURETTE BNE CEM 10IN DISP --

## (undated) DEVICE — SUTURE MCRYL SZ 2-0 L27IN ABSRB UD CP-1 1 L36MM 1/2 CIR REV Y266H

## (undated) DEVICE — Device: Brand: POWER-FLO®

## (undated) DEVICE — TOTAL KNEE DR ROBERSON: Brand: MEDLINE INDUSTRIES, INC.

## (undated) DEVICE — REM POLYHESIVE ADULT PATIENT RETURN ELECTRODE: Brand: VALLEYLAB

## (undated) DEVICE — 2000CC GUARDIAN II: Brand: GUARDIAN

## (undated) DEVICE — Z DISCONTINUED USE 2744636  DRESSING AQUACEL 14 IN ALG W3.5XL14IN POLYUR FLM CVR W/ HYDRCOLL

## (undated) DEVICE — 3000CC GUARDIAN II: Brand: GUARDIAN

## (undated) DEVICE — VORTEX VACUUM MIXING SYSTEM: Brand: VORTEX

## (undated) DEVICE — BANDAGE COMPR SELF ADH 5 YDX4 IN TAN STRL PREMIERPRO LF

## (undated) DEVICE — DRAPE,TOP,102X53,STERILE: Brand: MEDLINE